# Patient Record
Sex: MALE | Race: WHITE | NOT HISPANIC OR LATINO
[De-identification: names, ages, dates, MRNs, and addresses within clinical notes are randomized per-mention and may not be internally consistent; named-entity substitution may affect disease eponyms.]

---

## 2016-10-10 RX ORDER — LISINOPRIL 2.5 MG/1
1 TABLET ORAL
Qty: 0 | Refills: 0 | COMMUNITY
Start: 2016-10-10

## 2018-08-15 ENCOUNTER — APPOINTMENT (OUTPATIENT)
Dept: INTERNAL MEDICINE | Facility: CLINIC | Age: 83
End: 2018-08-15
Payer: MEDICARE

## 2018-08-15 VITALS
HEIGHT: 68 IN | BODY MASS INDEX: 30.31 KG/M2 | OXYGEN SATURATION: 91 % | TEMPERATURE: 98 F | DIASTOLIC BLOOD PRESSURE: 42 MMHG | HEART RATE: 77 BPM | SYSTOLIC BLOOD PRESSURE: 104 MMHG | WEIGHT: 200 LBS

## 2018-08-15 VITALS — DIASTOLIC BLOOD PRESSURE: 70 MMHG | SYSTOLIC BLOOD PRESSURE: 128 MMHG

## 2018-08-15 DIAGNOSIS — I50.9 HEART FAILURE, UNSPECIFIED: ICD-10-CM

## 2018-08-15 DIAGNOSIS — F03.90 UNSPECIFIED DEMENTIA W/OUT BEHAVIORAL DISTURBANCE: ICD-10-CM

## 2018-08-15 DIAGNOSIS — Z78.9 OTHER SPECIFIED HEALTH STATUS: ICD-10-CM

## 2018-08-15 DIAGNOSIS — Z00.00 ENCOUNTER FOR GENERAL ADULT MEDICAL EXAMINATION W/OUT ABNORMAL FINDINGS: ICD-10-CM

## 2018-08-15 LAB
ALBUMIN SERPL ELPH-MCNC: 3.9
ALP BLD-CCNC: 73
ALT SERPL-CCNC: 7
AST SERPL-CCNC: 15
BILIRUB SERPL-MCNC: 0.8
BUN SERPL-MCNC: 25
CALCIUM SERPL-MCNC: 9.3
CHLORIDE SERPL-SCNC: 101
CO2 SERPL-SCNC: 29
CREAT SERPL-MCNC: 1.1
CREAT SERPL-MCNC: 1.57
GFR SERPL CREATININE-BSD FRML MDRD: 63
POTASSIUM SERPL-SCNC: 4.8
PROT SERPL-MCNC: 6.5
SODIUM SERPL-SCNC: 138
TSH SERPL-ACNC: 2.83

## 2018-08-15 PROCEDURE — G0439: CPT | Mod: PD

## 2018-08-15 PROCEDURE — 99213 OFFICE O/P EST LOW 20 MIN: CPT | Mod: 25,PD

## 2018-08-15 NOTE — HISTORY OF PRESENT ILLNESS
[FreeTextEntry1] : Establish Care [de-identified] : 89 yo male bronchiectasis, COPD, interstitial lung dz, GERD, dementia, HTN, CABG, PTCA, retired police\par Walk Test aborted due to hip pain\par \par PCP Dr Moy changing\par    Dr Marilou Hoffman saw his son had AML and sent to me\par \par Dr Matias Aden Wolsey\par 1) AFIB Jan 2018  amiodarone, Xarelto  failed cardioversion and amio started\par \par 2) Pulm fibrosis 2010\par 3) CABG x 5 1995\par 4) stent x 2 RCA 1998\par 5) Dr Aden advised 40 lasix every other day due to pre renal azotemia written APRIL 2018\par 6) CHF BMP 1800 Feb 2018 [facility] no daily weights\par 7) MR mild\par 8) AS moderate\par \par Lives alone. Children live close\par Forgetful but can follow

## 2018-08-15 NOTE — REVIEW OF SYSTEMS
[Fever] : no fever [Chills] : no chills [Fatigue] : no fatigue [Hot Flashes] : no hot flashes [Chest Pain] : no chest pain [Palpitations] : no palpitations [Claudication] : no  leg claudication

## 2018-08-15 NOTE — HEALTH RISK ASSESSMENT
[Excellent] : ~his/her~ current health as excellent [Very Good] : ~his/her~  mood as very good [No falls in past year] : Patient reported no falls in the past year [Fully functional (bathing, dressing, toileting, transferring, walking, feeding)] : Fully functional (bathing, dressing, toileting, transferring, walking, feeding) [Independent] : preparing meals [Some assistance needed] : managing medications [Full assistance needed] : managing finances [Smoke Detector] : smoke detector [Carbon Monoxide Detector] : carbon monoxide detector [Seat Belt] :  uses seat belt [Sunscreen] : uses sunscreen [Discussed at today's visit] : Advance Directives Discussed at today's visit [Name: ___] : Health Care Proxy's Name: [unfilled]  [Relationship: ___] : Relationship: [unfilled] [] : No [de-identified] : 2PPD x 40 years                  stopped 1990 hypnosis [Reports changes in hearing] : Reports no changes in hearing [Reports changes in vision] : Reports no changes in vision [Reports changes in dental health] : Reports no changes in dental health

## 2018-08-16 ENCOUNTER — INPATIENT (INPATIENT)
Facility: HOSPITAL | Age: 83
LOS: 4 days | Discharge: HOME | DRG: 812 | End: 2018-08-21
Attending: HOSPITALIST | Admitting: HOSPITALIST
Payer: MEDICARE

## 2018-08-16 VITALS
RESPIRATION RATE: 24 BRPM | TEMPERATURE: 98 F | DIASTOLIC BLOOD PRESSURE: 71 MMHG | OXYGEN SATURATION: 93 % | HEART RATE: 24 BPM | SYSTOLIC BLOOD PRESSURE: 154 MMHG

## 2018-08-16 DIAGNOSIS — K92.2 GASTROINTESTINAL HEMORRHAGE, UNSPECIFIED: ICD-10-CM

## 2018-08-16 LAB
25(OH)D3 SERPL-MCNC: 55.1 NG/ML
ALBUMIN SERPL ELPH-MCNC: 3.7 G/DL — SIGNIFICANT CHANGE UP (ref 3.3–5)
ALBUMIN SERPL ELPH-MCNC: 3.8 G/DL
ALP BLD-CCNC: 72 U/L
ALP SERPL-CCNC: 72 U/L — SIGNIFICANT CHANGE UP (ref 40–120)
ALT FLD-CCNC: 7 U/L — LOW (ref 10–45)
ALT SERPL-CCNC: 8 U/L
ANION GAP SERPL CALC-SCNC: 12 MMOL/L
ANION GAP SERPL CALC-SCNC: 9 MMOL/L — SIGNIFICANT CHANGE UP (ref 5–17)
APTT BLD: 36.9 SEC — SIGNIFICANT CHANGE UP (ref 27.5–37.4)
AST SERPL-CCNC: 19 U/L — SIGNIFICANT CHANGE UP (ref 10–40)
AST SERPL-CCNC: 23 U/L
BASE EXCESS BLDV CALC-SCNC: 4 MMOL/L — HIGH (ref -2–2)
BASOPHILS # BLD AUTO: 0 K/UL — SIGNIFICANT CHANGE UP (ref 0–0.2)
BASOPHILS # BLD AUTO: 0.02 K/UL
BASOPHILS NFR BLD AUTO: 0.3 %
BASOPHILS NFR BLD AUTO: 0.4 % — SIGNIFICANT CHANGE UP (ref 0–2)
BILIRUB SERPL-MCNC: 0.8 MG/DL
BILIRUB SERPL-MCNC: 0.8 MG/DL — SIGNIFICANT CHANGE UP (ref 0.2–1.2)
BLD GP AB SCN SERPL QL: NEGATIVE — SIGNIFICANT CHANGE UP
BUN SERPL-MCNC: 24 MG/DL
BUN SERPL-MCNC: 24 MG/DL — HIGH (ref 7–23)
CA-I SERPL-SCNC: 1.16 MMOL/L — SIGNIFICANT CHANGE UP (ref 1.12–1.3)
CALCIUM SERPL-MCNC: 8.9 MG/DL — SIGNIFICANT CHANGE UP (ref 8.4–10.5)
CALCIUM SERPL-MCNC: 9.2 MG/DL
CHLORIDE BLDV-SCNC: 103 MMOL/L — SIGNIFICANT CHANGE UP (ref 96–108)
CHLORIDE SERPL-SCNC: 104 MMOL/L — SIGNIFICANT CHANGE UP (ref 96–108)
CHLORIDE SERPL-SCNC: 99 MMOL/L
CHOLEST SERPL-MCNC: 122 MG/DL
CHOLEST/HDLC SERPL: 3.2 RATIO
CO2 BLDV-SCNC: 31 MMOL/L — HIGH (ref 22–30)
CO2 SERPL-SCNC: 26 MMOL/L — SIGNIFICANT CHANGE UP (ref 22–31)
CO2 SERPL-SCNC: 27 MMOL/L
CREAT SERPL-MCNC: 1.66 MG/DL
CREAT SERPL-MCNC: 1.8 MG/DL — HIGH (ref 0.5–1.3)
EOSINOPHIL # BLD AUTO: 0.16 K/UL
EOSINOPHIL # BLD AUTO: 0.2 K/UL — SIGNIFICANT CHANGE UP (ref 0–0.5)
EOSINOPHIL NFR BLD AUTO: 2.7 %
EOSINOPHIL NFR BLD AUTO: 4.5 % — SIGNIFICANT CHANGE UP (ref 0–6)
GAS PNL BLDV: 139 MMOL/L — SIGNIFICANT CHANGE UP (ref 136–145)
GAS PNL BLDV: SIGNIFICANT CHANGE UP
GAS PNL BLDV: SIGNIFICANT CHANGE UP
GLUCOSE BLDV-MCNC: 89 MG/DL — SIGNIFICANT CHANGE UP (ref 70–99)
GLUCOSE SERPL-MCNC: 86 MG/DL
GLUCOSE SERPL-MCNC: 93 MG/DL — SIGNIFICANT CHANGE UP (ref 70–99)
HBA1C MFR BLD HPLC: 5.4 %
HCO3 BLDV-SCNC: 29 MMOL/L — SIGNIFICANT CHANGE UP (ref 21–29)
HCT VFR BLD CALC: 11.2
HCT VFR BLD CALC: 25.2 % — LOW (ref 39–50)
HCT VFR BLD CALC: 27.6 %
HCT VFR BLDA CALC: 26 % — LOW (ref 39–50)
HDLC SERPL-MCNC: 38 MG/DL
HGB BLD CALC-MCNC: 8.2 G/DL — LOW (ref 13–17)
HGB BLD-MCNC: 8.1 G/DL
HGB BLD-MCNC: 8.3 G/DL — LOW (ref 13–17)
IMM GRANULOCYTES NFR BLD AUTO: 0.2 %
LACTATE BLDV-MCNC: 2.1 MMOL/L — HIGH (ref 0.7–2)
LDLC SERPL CALC-MCNC: 67 MG/DL
LYMPHOCYTES # BLD AUTO: 1.2 K/UL — SIGNIFICANT CHANGE UP (ref 1–3.3)
LYMPHOCYTES # BLD AUTO: 1.58 K/UL
LYMPHOCYTES # BLD AUTO: 22.6 % — SIGNIFICANT CHANGE UP (ref 13–44)
LYMPHOCYTES NFR BLD AUTO: 27.1 %
MAN DIFF?: NORMAL
MCHC RBC-ENTMCNC: 29.3 GM/DL
MCHC RBC-ENTMCNC: 29.9 PG
MCHC RBC-ENTMCNC: 32 PG — SIGNIFICANT CHANGE UP (ref 27–34)
MCHC RBC-ENTMCNC: 32.8 GM/DL — SIGNIFICANT CHANGE UP (ref 32–36)
MCV RBC AUTO: 100.6
MCV RBC AUTO: 101.8 FL
MCV RBC AUTO: 97.6 FL — SIGNIFICANT CHANGE UP (ref 80–100)
MONOCYTES # BLD AUTO: 0.45 K/UL
MONOCYTES # BLD AUTO: 0.5 K/UL — SIGNIFICANT CHANGE UP (ref 0–0.9)
MONOCYTES NFR BLD AUTO: 10.3 % — SIGNIFICANT CHANGE UP (ref 2–14)
MONOCYTES NFR BLD AUTO: 7.7 %
NEUTROPHILS # BLD AUTO: 3.2 K/UL — SIGNIFICANT CHANGE UP (ref 1.8–7.4)
NEUTROPHILS # BLD AUTO: 3.61 K/UL
NEUTROPHILS NFR BLD AUTO: 62 %
NEUTROPHILS NFR BLD AUTO: 62.2 % — SIGNIFICANT CHANGE UP (ref 43–77)
PCO2 BLDV: 52 MMHG — HIGH (ref 35–50)
PH BLDV: 7.37 — SIGNIFICANT CHANGE UP (ref 7.35–7.45)
PLATELET # BLD AUTO: 219 K/UL — SIGNIFICANT CHANGE UP (ref 150–400)
PLATELET # BLD AUTO: 244 K/UL
PO2 BLDV: 20 MMHG — LOW (ref 25–45)
POTASSIUM BLDV-SCNC: 4.7 MMOL/L — SIGNIFICANT CHANGE UP (ref 3.5–5.3)
POTASSIUM SERPL-MCNC: 5.1 MMOL/L — SIGNIFICANT CHANGE UP (ref 3.5–5.3)
POTASSIUM SERPL-SCNC: 4.7 MMOL/L
POTASSIUM SERPL-SCNC: 5.1 MMOL/L — SIGNIFICANT CHANGE UP (ref 3.5–5.3)
PROT SERPL-MCNC: 6.8 G/DL — SIGNIFICANT CHANGE UP (ref 6–8.3)
PROT SERPL-MCNC: 6.9 G/DL
RBC # BLD: 2.58 M/UL — LOW (ref 4.2–5.8)
RBC # BLD: 2.71 M/UL
RBC # FLD: 14.7 % — HIGH (ref 10.3–14.5)
RBC # FLD: 17 %
RH IG SCN BLD-IMP: POSITIVE — SIGNIFICANT CHANGE UP
SAO2 % BLDV: 22 % — LOW (ref 67–88)
SODIUM SERPL-SCNC: 138 MMOL/L
SODIUM SERPL-SCNC: 139 MMOL/L — SIGNIFICANT CHANGE UP (ref 135–145)
TRIGL SERPL-MCNC: 85 MG/DL
WBC # BLD: 5.1 K/UL — SIGNIFICANT CHANGE UP (ref 3.8–10.5)
WBC # FLD AUTO: 5.1 K/UL — SIGNIFICANT CHANGE UP (ref 3.8–10.5)
WBC # FLD AUTO: 5.83 K/UL

## 2018-08-16 PROCEDURE — 99285 EMERGENCY DEPT VISIT HI MDM: CPT

## 2018-08-16 PROCEDURE — 71045 X-RAY EXAM CHEST 1 VIEW: CPT | Mod: 26

## 2018-08-16 RX ORDER — PANTOPRAZOLE SODIUM 20 MG/1
80 TABLET, DELAYED RELEASE ORAL ONCE
Qty: 0 | Refills: 0 | Status: COMPLETED | OUTPATIENT
Start: 2018-08-16 | End: 2018-08-16

## 2018-08-16 RX ADMIN — PANTOPRAZOLE SODIUM 80 MILLIGRAM(S): 20 TABLET, DELAYED RELEASE ORAL at 23:40

## 2018-08-16 NOTE — ED ADULT NURSE NOTE - NSIMPLEMENTINTERV_GEN_ALL_ED
Implemented All Fall with Harm Risk Interventions:  [place] to call system. Call bell, personal items and telephone within reach. Instruct patient to call for assistance. Room bathroom lighting operational. Non-slip footwear when patient is off stretcher. Physically safe environment: no spills, clutter or unnecessary equipment. Stretcher in lowest position, wheels locked, appropriate side rails in place. Provide visual cue, wrist band, yellow gown, etc. Monitor gait and stability. Monitor for mental status changes and reorient to person, place, and time. Review medications for side effects contributing to fall risk. Reinforce activity limits and safety measures with patient and family. Provide visual clues: red socks.

## 2018-08-16 NOTE — ED PROVIDER NOTE - OBJECTIVE STATEMENT
89 yo M was instructed to come into the ED by his PCP (Dr. Ambrose Handley) due to abnormal blood work which indicated pt was anemic. Pt reports no acute symptoms/pain other than being more fatigues/sob on exertion for the past few months and no change in diet. Denies any SOB/CP in ED. PMHx of CABG x5 in 1995, 2 stents in RCA 6/1998, atrial fib, moderate aortic stenosis, pulmonary fibrosis diagnosed in 2000.     Dr. Farzaneh Wade (Internist) [phone] [address], [place] [address]  Dr. Ambrose Handley (New PCP) [phone]  [address] [facility] [address] [address]   Dr. Grigsby (Pulmonologist) [phone] [address] [facility] [address], [place] [address]    Daily Medications:    Xarelto 15 mg daily   lipitor 20mg daily  aricept 5mg daily  ecotrin 81 mg daily  vit D3 2000 IU daily  n-acetyl cysteine 900 mg daily  centrum ilver one daily  lisinopril 5 mg daily  amiodarone 200mg daily  furosemide 40 mg every other day   metoprolol ER 50 mg daily  latanoprost .005% one drop in each eye daily  anoro Ellipta 62.5-25 mcg one inhalation daily 89 yo M was instructed to come into the ED by his PCP (Dr. Ambrose Handley) due to abnormal blood work which indicated pt was anemic. Pt reports no acute symptoms/pain other than being more fatigues/sob on exertion for the past few months and no change in diet. Denies any SOB/CP in ED. PMHx of CABG x5 in 1995, 2 stents in RCA 6/1998, atrial fib, moderate aortic stenosis, pulmonary fibrosis diagnosed in 2000.     Dr. Ambrose Handley (New PCP) [phone] [address] [facility] [address] [address]   Dr. Grigsby (Pulmonologist) [phone] [address] [facility] [address], [place] [address]    Daily Medications:    Xarelto 15 mg daily   lipitor 20mg daily  aricept 5mg daily  ecotrin 81 mg daily  vit D3 2000 IU daily  n-acetyl cysteine 900 mg daily  centrum ilver one daily  lisinopril 5 mg daily  amiodarone 200mg daily  furosemide 40 mg every other day   metoprolol ER 50 mg daily  latanoprost .005% one drop in each eye daily  anoro Ellipta 62.5-25 mcg one inhalation daily

## 2018-08-16 NOTE — ED PROVIDER NOTE - NS_ ATTENDINGSCRIBEDETAILS _ED_A_ED_FT
The scribe's documentation has been prepared under my direction and personally reviewed by me in its entirety. I confirm that the note above accurately reflects all work, treatment, procedures, and medical decision making performed by me (Dr. Stafford).

## 2018-08-16 NOTE — ED ADULT NURSE NOTE - PMH
Atrial fibrillation, unspecified type    Coronary atherosclerosis of unspecified type of vessel, native or graft    Dementia    High Cholesterol    HTN (hypertension)    Inguinal Hernia    Pulmonary fibrosis  diagnosed in 2000 as per family
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
## 2018-08-16 NOTE — ED ADULT NURSE NOTE - OBJECTIVE STATEMENT
Pt sent to ER by PMD for low hemoglobin on blood work done yesterday.  Pt without any history of anemia or GI bleeds.  On Xarelto for a fib.  Has not noticed any bloody or dark stool.  Endorses fatigue and sob with exertion x few months, none at rest.  Pt pale appearing, abd soft/nt/nd, skin wdi, denies cp ,sob, nvd, abd pain, falls, bleeding, urinary symptoms.

## 2018-08-16 NOTE — ED CLERICAL - NS ED CLERK NOTE PRE-ARRIVAL INFORMATION; ADDITIONAL PRE-ARRIVAL INFORMATION
hist of afib, 2 stents in 1998, cabbage x5 in 1995, hem =8.1, mcv=high, pt is dehydrated, cka, anemia  admit to hospitalist

## 2018-08-16 NOTE — ED PROVIDER NOTE - PHYSICAL EXAMINATION
GEN: no acute respiratory distress. nontoxic, speaking comfortably in full sentences, ambulating with steady gait.  HEENT: NCAT. face symmetrical. PERRL 4mm, EOMI, normal auditory canal b/l, normal TM b/l. no hemotympanum. nose midline and without discharge,  MMM, oropharynx wnl.  Neck: no JVD, trachea midline, no LAD  CV: RRR. +S1S2, systolic murmur. 2+ pulses in 4 extremities, cap refill <2 sec. bradycardic rate of 50 with systolic murmur  Chest: CTA B/l. no retractions. crackles BL in both bases good air movement. no tenderness. no rash or ecchymosis  ABD: +BS, soft, non distended, non tender. No guarding/rebound. No lesions, ecchymosis, surgical scar  : no cva or suprapubic tenderness  MSK: No clubbing, cyanosis, trace LE edema. FROM of all extremities. no tenderness to palpation. No midline or paraspinal tenderness. no spinal step-offs.  Neuro: AOOX3.  CN 2-12 intact; Sensation intact, motor 5/5 throughout. finger-nose/heal-shin intact. no ataxia  SKIN: No erythema, lesions or rash

## 2018-08-16 NOTE — ED PROVIDER NOTE - NS ED ROS FT
Constitutional: No fever. no chills. no unexpected weight loss/gain  Eyes: No visual changes, eye pain or redness  HEENT: No throat pain, ear pain, nasal pain. No nose bleeding.  CV: No chest pain, no palpitations  Resp: No shortness of breath, no cough, crackles BL in both bases  GI: No abdominal pain. No nausea or vomiting. No diarrhea. No constipation.   : No dysuria, hematuria. no urinary frequency, no urinary urgency.  MSK: No musculoskeletal pain, trace edema in LE  Skin: No rash, lesions, no bruises  Neuro: No headache. No numbness or tingling. No weakness.  Allergy/Immunology: no allergy to medicine or food.

## 2018-08-16 NOTE — ED PROVIDER NOTE - MEDICAL DECISION MAKING DETAILS
87 yo M called into the ED due to anemia diagnosed by routine blood work by PCP pt has no acute complaints but has been describing dyspnea on exertion for past several months will check CBC CMP, triponin, CXR and reassess.

## 2018-08-17 ENCOUNTER — TRANSCRIPTION ENCOUNTER (OUTPATIENT)
Age: 83
End: 2018-08-17

## 2018-08-17 DIAGNOSIS — I25.10 ATHEROSCLEROTIC HEART DISEASE OF NATIVE CORONARY ARTERY WITHOUT ANGINA PECTORIS: ICD-10-CM

## 2018-08-17 DIAGNOSIS — F03.90 UNSPECIFIED DEMENTIA WITHOUT BEHAVIORAL DISTURBANCE: ICD-10-CM

## 2018-08-17 DIAGNOSIS — I50.9 HEART FAILURE, UNSPECIFIED: ICD-10-CM

## 2018-08-17 DIAGNOSIS — I10 ESSENTIAL (PRIMARY) HYPERTENSION: ICD-10-CM

## 2018-08-17 DIAGNOSIS — R09.89 OTHER SPECIFIED SYMPTOMS AND SIGNS INVOLVING THE CIRCULATORY AND RESPIRATORY SYSTEMS: ICD-10-CM

## 2018-08-17 DIAGNOSIS — I48.91 UNSPECIFIED ATRIAL FIBRILLATION: ICD-10-CM

## 2018-08-17 DIAGNOSIS — N17.9 ACUTE KIDNEY FAILURE, UNSPECIFIED: ICD-10-CM

## 2018-08-17 DIAGNOSIS — Z29.9 ENCOUNTER FOR PROPHYLACTIC MEASURES, UNSPECIFIED: ICD-10-CM

## 2018-08-17 DIAGNOSIS — D64.9 ANEMIA, UNSPECIFIED: ICD-10-CM

## 2018-08-17 DIAGNOSIS — J84.10 PULMONARY FIBROSIS, UNSPECIFIED: ICD-10-CM

## 2018-08-17 LAB
ALBUMIN SERPL ELPH-MCNC: 3.4 G/DL — SIGNIFICANT CHANGE UP (ref 3.3–5)
ALP SERPL-CCNC: 69 U/L — SIGNIFICANT CHANGE UP (ref 40–120)
ALT FLD-CCNC: 6 U/L — LOW (ref 10–45)
ANION GAP SERPL CALC-SCNC: 9 MMOL/L — SIGNIFICANT CHANGE UP (ref 5–17)
AST SERPL-CCNC: 18 U/L — SIGNIFICANT CHANGE UP (ref 10–40)
BASOPHILS # BLD AUTO: 0.03 K/UL — SIGNIFICANT CHANGE UP (ref 0–0.2)
BASOPHILS NFR BLD AUTO: 0.6 % — SIGNIFICANT CHANGE UP (ref 0–2)
BILIRUB SERPL-MCNC: 1 MG/DL — SIGNIFICANT CHANGE UP (ref 0.2–1.2)
BUN SERPL-MCNC: 23 MG/DL — SIGNIFICANT CHANGE UP (ref 7–23)
CALCIUM SERPL-MCNC: 8.7 MG/DL — SIGNIFICANT CHANGE UP (ref 8.4–10.5)
CHLORIDE SERPL-SCNC: 104 MMOL/L — SIGNIFICANT CHANGE UP (ref 96–108)
CO2 SERPL-SCNC: 26 MMOL/L — SIGNIFICANT CHANGE UP (ref 22–31)
CREAT SERPL-MCNC: 1.74 MG/DL — HIGH (ref 0.5–1.3)
EOSINOPHIL # BLD AUTO: 0.14 K/UL — SIGNIFICANT CHANGE UP (ref 0–0.5)
EOSINOPHIL NFR BLD AUTO: 2.9 % — SIGNIFICANT CHANGE UP (ref 0–6)
FOLATE SERPL-MCNC: >20 NG/ML — SIGNIFICANT CHANGE UP
GLUCOSE SERPL-MCNC: 89 MG/DL — SIGNIFICANT CHANGE UP (ref 70–99)
HCT VFR BLD CALC: 25.2 % — LOW (ref 39–50)
HGB BLD-MCNC: 7.9 G/DL — LOW (ref 13–17)
IMM GRANULOCYTES NFR BLD AUTO: 0.2 % — SIGNIFICANT CHANGE UP (ref 0–1.5)
IRON SATN MFR SERPL: 24 UG/DL — LOW (ref 45–165)
IRON SATN MFR SERPL: 7 % — LOW (ref 16–55)
LYMPHOCYTES # BLD AUTO: 1.3 K/UL — SIGNIFICANT CHANGE UP (ref 1–3.3)
LYMPHOCYTES # BLD AUTO: 27.4 % — SIGNIFICANT CHANGE UP (ref 13–44)
MCHC RBC-ENTMCNC: 30.5 PG — SIGNIFICANT CHANGE UP (ref 27–34)
MCHC RBC-ENTMCNC: 31.3 GM/DL — LOW (ref 32–36)
MCV RBC AUTO: 97.3 FL — SIGNIFICANT CHANGE UP (ref 80–100)
MONOCYTES # BLD AUTO: 0.54 K/UL — SIGNIFICANT CHANGE UP (ref 0–0.9)
MONOCYTES NFR BLD AUTO: 11.4 % — SIGNIFICANT CHANGE UP (ref 2–14)
NEUTROPHILS # BLD AUTO: 2.73 K/UL — SIGNIFICANT CHANGE UP (ref 1.8–7.4)
NEUTROPHILS NFR BLD AUTO: 57.5 % — SIGNIFICANT CHANGE UP (ref 43–77)
PLATELET # BLD AUTO: 213 K/UL — SIGNIFICANT CHANGE UP (ref 150–400)
POTASSIUM SERPL-MCNC: 4.5 MMOL/L — SIGNIFICANT CHANGE UP (ref 3.5–5.3)
POTASSIUM SERPL-SCNC: 4.5 MMOL/L — SIGNIFICANT CHANGE UP (ref 3.5–5.3)
PROT SERPL-MCNC: 6.1 G/DL — SIGNIFICANT CHANGE UP (ref 6–8.3)
RBC # BLD: 2.59 M/UL — LOW (ref 4.2–5.8)
RBC # FLD: 16.1 % — HIGH (ref 10.3–14.5)
SODIUM SERPL-SCNC: 139 MMOL/L — SIGNIFICANT CHANGE UP (ref 135–145)
TIBC SERPL-MCNC: 337 UG/DL — SIGNIFICANT CHANGE UP (ref 220–430)
UIBC SERPL-MCNC: 313 UG/DL — SIGNIFICANT CHANGE UP (ref 110–370)
VIT B12 SERPL-MCNC: 621 PG/ML — SIGNIFICANT CHANGE UP (ref 232–1245)
WBC # BLD: 4.75 K/UL — SIGNIFICANT CHANGE UP (ref 3.8–10.5)
WBC # FLD AUTO: 4.75 K/UL — SIGNIFICANT CHANGE UP (ref 3.8–10.5)

## 2018-08-17 PROCEDURE — 99223 1ST HOSP IP/OBS HIGH 75: CPT

## 2018-08-17 PROCEDURE — 12345: CPT | Mod: NC

## 2018-08-17 PROCEDURE — 99222 1ST HOSP IP/OBS MODERATE 55: CPT

## 2018-08-17 RX ORDER — PANTOPRAZOLE SODIUM 20 MG/1
8 TABLET, DELAYED RELEASE ORAL
Qty: 80 | Refills: 0 | Status: DISCONTINUED | OUTPATIENT
Start: 2018-08-17 | End: 2018-08-17

## 2018-08-17 RX ORDER — UMECLIDINIUM BROMIDE AND VILANTEROL TRIFENATATE 62.5; 25 UG/1; UG/1
1 POWDER RESPIRATORY (INHALATION) DAILY
Qty: 0 | Refills: 0 | Status: DISCONTINUED | OUTPATIENT
Start: 2018-08-17 | End: 2018-08-21

## 2018-08-17 RX ORDER — LATANOPROST 0.05 MG/ML
1 SOLUTION/ DROPS OPHTHALMIC; TOPICAL AT BEDTIME
Qty: 0 | Refills: 0 | Status: DISCONTINUED | OUTPATIENT
Start: 2018-08-17 | End: 2018-08-21

## 2018-08-17 RX ORDER — CHOLECALCIFEROL (VITAMIN D3) 125 MCG
1000 CAPSULE ORAL DAILY
Qty: 0 | Refills: 0 | Status: DISCONTINUED | OUTPATIENT
Start: 2018-08-17 | End: 2018-08-21

## 2018-08-17 RX ORDER — PANTOPRAZOLE SODIUM 20 MG/1
40 TABLET, DELAYED RELEASE ORAL
Qty: 0 | Refills: 0 | Status: DISCONTINUED | OUTPATIENT
Start: 2018-08-17 | End: 2018-08-21

## 2018-08-17 RX ORDER — ACETAMINOPHEN 500 MG
650 TABLET ORAL EVERY 6 HOURS
Qty: 0 | Refills: 0 | Status: DISCONTINUED | OUTPATIENT
Start: 2018-08-17 | End: 2018-08-21

## 2018-08-17 RX ORDER — ATORVASTATIN CALCIUM 80 MG/1
20 TABLET, FILM COATED ORAL AT BEDTIME
Qty: 0 | Refills: 0 | Status: DISCONTINUED | OUTPATIENT
Start: 2018-08-17 | End: 2018-08-21

## 2018-08-17 RX ORDER — METOPROLOL TARTRATE 50 MG
25 TABLET ORAL DAILY
Qty: 0 | Refills: 0 | Status: DISCONTINUED | OUTPATIENT
Start: 2018-08-17 | End: 2018-08-21

## 2018-08-17 RX ORDER — AMIODARONE HYDROCHLORIDE 400 MG/1
200 TABLET ORAL DAILY
Qty: 0 | Refills: 0 | Status: DISCONTINUED | OUTPATIENT
Start: 2018-08-17 | End: 2018-08-21

## 2018-08-17 RX ORDER — DONEPEZIL HYDROCHLORIDE 10 MG/1
5 TABLET, FILM COATED ORAL AT BEDTIME
Qty: 0 | Refills: 0 | Status: DISCONTINUED | OUTPATIENT
Start: 2018-08-17 | End: 2018-08-21

## 2018-08-17 RX ORDER — LATANOPROST 0.05 MG/ML
1 SOLUTION/ DROPS OPHTHALMIC; TOPICAL
Qty: 0 | Refills: 0 | COMMUNITY

## 2018-08-17 RX ORDER — ASPIRIN/CALCIUM CARB/MAGNESIUM 324 MG
81 TABLET ORAL
Qty: 0 | Refills: 0 | COMMUNITY

## 2018-08-17 RX ADMIN — LATANOPROST 1 DROP(S): 0.05 SOLUTION/ DROPS OPHTHALMIC; TOPICAL at 21:39

## 2018-08-17 RX ADMIN — UMECLIDINIUM BROMIDE AND VILANTEROL TRIFENATATE 1 PUFF(S): 62.5; 25 POWDER RESPIRATORY (INHALATION) at 11:40

## 2018-08-17 RX ADMIN — Medication 1000 UNIT(S): at 11:39

## 2018-08-17 RX ADMIN — PANTOPRAZOLE SODIUM 40 MILLIGRAM(S): 20 TABLET, DELAYED RELEASE ORAL at 11:39

## 2018-08-17 RX ADMIN — PANTOPRAZOLE SODIUM 40 MILLIGRAM(S): 20 TABLET, DELAYED RELEASE ORAL at 17:34

## 2018-08-17 RX ADMIN — ATORVASTATIN CALCIUM 20 MILLIGRAM(S): 80 TABLET, FILM COATED ORAL at 21:39

## 2018-08-17 RX ADMIN — DONEPEZIL HYDROCHLORIDE 5 MILLIGRAM(S): 10 TABLET, FILM COATED ORAL at 21:39

## 2018-08-17 RX ADMIN — PANTOPRAZOLE SODIUM 10 MG/HR: 20 TABLET, DELAYED RELEASE ORAL at 01:26

## 2018-08-17 RX ADMIN — AMIODARONE HYDROCHLORIDE 200 MILLIGRAM(S): 400 TABLET ORAL at 11:39

## 2018-08-17 RX ADMIN — PANTOPRAZOLE SODIUM 10 MG/HR: 20 TABLET, DELAYED RELEASE ORAL at 08:56

## 2018-08-17 NOTE — CONSULT NOTE ADULT - ATTENDING COMMENTS
89 yo M pmh dementia, HTN, pulm fibrosis not on o2, cad s/p cabg s/p angel (? ASA?), afib on Xarelto presenting for acute, symptomatic anemia with no overt sign of GIB.  Gradual development of symptoms and sent in by PCP for anemia.  H&H stable over 8 hours with brown stool. Some tachypnea on exam, but conversing easily with no hypoxia.  Suspect symptomatic from anemia.  Ultimately, if family and patient amenable, reasonable to pursue GI evaluation as inpatient.    Impression:  1) Symptomatic anemia  2) CAD s/p CABG  3) Afib on Xarelto  4) Dementia  5) Pulm Fibrosis    Plan:  1) Full diet today, start clear diet saturday evening if planning for procedures monday  2) EGD/Colon Monday if patient/family amenable  3) F/u Iron studies  4) Given symptomatic, reasonable to give 1 unit prbc - defer to the Hospitalist team  5) Daily PPI is adequate  6) Please clarify home antiplatelet and anticoagulant agents as will dictate if warrants PPI ppx

## 2018-08-17 NOTE — H&P ADULT - NSHPPHYSICALEXAM_GEN_ALL_CORE
Vital Signs Last 24 Hrs  T(C): 36.6 (16 Aug 2018 23:30), Max: 36.7 (16 Aug 2018 18:59)  T(F): 97.9 (16 Aug 2018 23:30), Max: 98 (16 Aug 2018 18:59)  HR: 55 (16 Aug 2018 23:30) (24 - 55)  BP: 154/75 (16 Aug 2018 23:30) (134/42 - 160/57)  BP(mean): --  RR: 18 (16 Aug 2018 23:30) (18 - 24)  SpO2: 98% (16 Aug 2018 23:30) (93% - 99%) Vital Signs Last 24 Hrs  T(C): 36.6 (16 Aug 2018 23:30), Max: 36.7 (16 Aug 2018 18:59)  T(F): 97.9 (16 Aug 2018 23:30), Max: 98 (16 Aug 2018 18:59)  HR: 55 (16 Aug 2018 23:30) (24 - 55)  BP: 154/75 (16 Aug 2018 23:30) (134/42 - 160/57)  BP(mean): --  RR: 18 (16 Aug 2018 23:30) (18 - 24)  SpO2: 98% (16 Aug 2018 23:30) (93% - 99%)    GENERAL: No acute distress, well-developed  HEAD:  Atraumatic, Normocephalic  ENT: EOMI, PERRLA, conjunctiva and sclera clear,  moist mucosa no pharyngeal erythema or exudates   NECK: supple , no JVD   CHEST/LUNG: Clear to auscultation bilaterally; No wheeze, equal breath sounds bilaterally   BACK: No spinal tenderness,  No CVA tenderness   HEART: Regular rate and rhythm;+ systolic murmur at apex , no rubs, or gallops  ABDOMEN: Soft, Nontender, Nondistended; Bowel sounds present  GI: Per ED exam , soft stool , no gross blood or melena, + guaiac   EXTREMITIES:  No clubbing, cyanosis, + trace  edema  MSK: No joint swelling or effusions, ROM intact   PSYCH: Normal behavior/affect  NEUROLOGY: AAOx3, non-focal, cranial nerves intact  SKIN: Normal color, No rashes or lesions

## 2018-08-17 NOTE — H&P ADULT - PROBLEM SELECTOR PLAN 1
hgb decreased to 8.3 from 11 in march , MCV wnl however RDW increased , suspect occult GI bleeding   - continue PPI   - GI consult emailed - day team to f/u further recommendations   - clear liquid diet for anticipated GI intervention   - f/u am CBC  - iron panels/ b12/folate   - Hold Xarelto  - hold ASA

## 2018-08-17 NOTE — H&P ADULT - ASSESSMENT
88 M w/pmh dementia , HTN , pulmonary fibrosis , cad s/p CABG (’95) and s/p stent x 2 , and  atrial fibrillation on Xarelto, p/w  anemia

## 2018-08-17 NOTE — H&P ADULT - PROBLEM SELECTOR PLAN 2
2/2 to lasix vs. blood loss   - hold lasix   - hold lisinopril   - strict ins and outs   - monitor renal fx

## 2018-08-17 NOTE — H&P ADULT - PROBLEM SELECTOR PLAN 3
- holding Xarelto in setting of GI bleeding  - continue amiodarone   - decreased metoprolol dose to 25 given bradycardia , can increase back to 50mg if tolerating

## 2018-08-17 NOTE — CONSULT NOTE ADULT - ASSESSMENT
Impression:  1) Anemia - normocytic with low iron and low saturation consistent and normal TIBC with iron def anemia.    Recommendations:   - Impression:  1) Anemia - normocytic with low iron and low saturation consistent and normal TIBC with iron def anemia.  Patient is HD stable without any overt GI bleeding and rectal exam is significant for brown stool in vault.  Patient may have occult GI bleeding but is not likely that the source of the anemia is from a more brisk GI source such as PUD, diverticulosis, or an actively bleeding angiectasia what would require an inpatient intervention given patient presentation.    Recommendations:   - no need for inpatient endoscopic intervention   - patient can follow up with outpatient gastroenterologist   - can continue to trend H/H while admitted   - transfuse if Hg < 7   - call GI with an overt GI bleeding   - rest of care per primary team   - call GI back with any other farhan Hughes, PGY-4  Gastroenterology Fellow  (After 5 pm page GI on call) Impression:  1) Anemia - normocytic with low iron and low saturation consistent and normal TIBC with iron def anemia.  Patient is HD stable without any overt GI bleeding and rectal exam is significant for brown stool in vault.  Source possibly a GI source    Recommendations:   - no need for urgent EGD but will plan for EGD/colon on Monday   - no need for PPI drip can switch to BID or PO   - can continue to trend H/H while admitted   - transfuse if Hg < 7   - call GI with an overt GI bleeding   - rest of care per primary team    Antonina Hughes, PGY-4  Gastroenterology Fellow  (After 5 pm page GI on call) Impression:  1) Anemia - normocytic with low iron and low saturation consistent and normal TIBC with iron def anemia.  Patient is HD stable without any overt GI bleeding and rectal exam is significant for brown stool in vault.  Source possibly a GI given significant drop, possibly occult bleeding vs, PUD vs angioectasias, less likely diverticulosis given lack of BRBPR.    Recommendations:   - no need for urgent EGD but will plan for EGD/colon on Monday   - clear diet Saturday   - NPO after midnight Sunday   - Movi Prep Sunday night   - patient will need to take Movi Prep 1 Liter at 6:00 pm and 1 Liter at 10:00 pm   - ensure patient has completed entire prep and is having clear bowel movements   - no need for PPI drip can switch to BID or PO   - can continue to trend H/H while admitted   - transfuse if Hg < 7   - call GI with an overt GI bleeding   - rest of care per primary team    Referance : Effectiveness of a simplidied "patient friendly" split dose polyethylene glycol colonoscopy prep in [facility] patients. Iglesia MCCORD et al Interv Gastroenterol. (2012)    Antonina Hughes, PGY-4  Gastroenterology Fellow  (After 5 pm page GI on call)

## 2018-08-17 NOTE — H&P ADULT - HISTORY OF PRESENT ILLNESS
Patient is an 88 year old male with a past medical history significant for dementia , HTN , pulmonary fibrosis , cad s/p CABG (’95) and s/p stent x 2 , and  atrial fibrillation on Xarelto, who presents for anemia found on outpatient labs. Patient is an 88 year old male with a past medical history significant for dementia , HTN , pulmonary fibrosis , cad s/p CABG (’95) and s/p stent x 2 , and  atrial fibrillation on Xarelto, who presents for anemia found on outpatient labs. Per Patients daughter, patient has chronic dyspnea on exertion secondary to pulmonary fibrosis , however for the past several weeks he was noticeably more fatigued and had increased dyspnea. He had no abdominal pain , no hematochezia and no melena. Patient was diagnosed with Afib this past December and cardioverted at that time and subsequently been on Xarelto without bleeding incident. Patient also on baby aspirin. No NSIAD use. Per daughter, most recent hgb in March '18 was about 11.3 , currently around 8. last colonoscopy was about 10 years ago with diverticulosis. Patient currently reports he feels fine, no active complaints.

## 2018-08-17 NOTE — H&P ADULT - NSHPLABSRESULTS_GEN_ALL_CORE
Labs personally reviewed:                          8.3    5.1   )-----------( 219      ( 16 Aug 2018 20:06 )             25.2     08-16    139  |  104  |  24<H>  ----------------------------<  93  5.1   |  26  |  1.80<H>    Ca    8.9      16 Aug 2018 20:06    TPro  6.8  /  Alb  3.7  /  TBili  0.8  /  DBili  x   /  AST  19  /  ALT  7<L>  /  AlkPhos  72  08-16        LIVER FUNCTIONS - ( 16 Aug 2018 20:06 )  Alb: 3.7 g/dL / Pro: 6.8 g/dL / ALK PHOS: 72 U/L / ALT: 7 U/L / AST: 19 U/L / GGT: x           PTT - ( 16 Aug 2018 20:06 )  PTT:36.9 sec    CAPILLARY BLOOD GLUCOSE          Imaging:  CXR personally reviewed: no focal opacity    EKG personally reviewed: Labs personally reviewed:                          8.3    5.1   )-----------( 219      ( 16 Aug 2018 20:06 )             25.2     08-16    139  |  104  |  24<H>  ----------------------------<  93  5.1   |  26  |  1.80<H>    Ca    8.9      16 Aug 2018 20:06    TPro  6.8  /  Alb  3.7  /  TBili  0.8  /  DBili  x   /  AST  19  /  ALT  7<L>  /  AlkPhos  72  08-16        LIVER FUNCTIONS - ( 16 Aug 2018 20:06 )  Alb: 3.7 g/dL / Pro: 6.8 g/dL / ALK PHOS: 72 U/L / ALT: 7 U/L / AST: 19 U/L / GGT: x           PTT - ( 16 Aug 2018 20:06 )  PTT:36.9 sec    CAPILLARY BLOOD GLUCOSE          Imaging:  CXR personally reviewed: no focal opacity    EKG personally reviewed: sinus bradycardia at 51 bpm , no s-t segment changes

## 2018-08-17 NOTE — CONSULT NOTE ADULT - SUBJECTIVE AND OBJECTIVE BOX
Chief Complaint:  Patient is a 88y old  Male who presents with a chief complaint of anemia (17 Aug 2018 01:39)      HPI:  88 year old male with PMHx of dementia , HTN , pulmonary fibrosis , cad s/p CABG (1995) and s/p stent x 2 ,   atrial fibrillation on Xarelto, who presents for anemia found on outpatient labs. The patient has chronic dyspnea on exertion but for the past several weeks he was noticeably more fatigued and had increased dyspnea.  Per daughter, most recent hgb in March of this yearwas about 11.3 , currently around 8. The patient denies any abdominal pain, melena, hematachezia, NSAID use.  The patient has had a colon 10 years ago which showed diverticulosis    Allergies:  No Known Allergies      Home Medications:    Hospital Medications:  acetaminophen   Tablet. 650 milliGRAM(s) Oral every 6 hours PRN  amiodarone    Tablet 200 milliGRAM(s) Oral daily  atorvastatin 20 milliGRAM(s) Oral at bedtime  cholecalciferol 1000 Unit(s) Oral daily  donepezil 5 milliGRAM(s) Oral at bedtime  latanoprost 0.005% Ophthalmic Solution 1 Drop(s) Both EYES at bedtime  metoprolol succinate ER 25 milliGRAM(s) Oral daily  pantoprazole Infusion 8 mG/Hr IV Continuous <Continuous>  umeclidinium 62.5 MICROgram(s)/vilanterol 25 MICROgram(s) Inhaler 1 Puff(s) Inhalation daily      PMHX/PSHX:  Pulmonary fibrosis  Atrial fibrillation, unspecified type  Dementia  HTN (hypertension)  Inguinal Hernia  High Cholesterol  Coronary atherosclerosis of unspecified type of vessel, native or graft  S/P CABG (Coronary Artery Bypass Graft)  s/p Angioplasty with Stent      Family history:  Family history of stomach cancer (Sibling)      Social History:     ROS:     General:  No wt loss, fevers, chills, night sweats, fatigue,   Eyes:  Good vision, no reported pain  ENT:  No sore throat, pain, runny nose, dysphagia  CV:  No pain, palpitations, hypo/hypertension  Resp:  No dyspnea, cough, tachypnea, wheezing  GI:  No pain, No nausea, No vomiting, No diarrhea, No constipation, No weight loss, No fever, No pruritis, No rectal bleeding, No tarry stools, No dysphagia,  :  No pain, bleeding, incontinence, nocturia  Muscle:  No pain, weakness  Neuro:  No weakness, tingling, memory problems  Psych:  No fatigue, insomnia, mood problems, depression  Endocrine:  No polyuria, polydipsia, cold/heat intolerance  Heme:  No petechiae, ecchymosis, easy bruisability  Skin:  No rash, tattoos, scars, edema      PHYSICAL EXAM:     GENERAL:  Appears stated age, well-groomed, well-nourished, no distress  ABDOMEN:  Soft, non-tender, non-distended, normoactive bowel sounds,  no masses ,no hepato-splenomegaly, no signs of chronic liver disease  EXTEREMITIES:  no cyanosis,clubbing or edema  NEURO:  Alert, oriented, no tremor    Vital Signs:  Vital Signs Last 24 Hrs  T(C): 37.1 (17 Aug 2018 04:26), Max: 37.1 (17 Aug 2018 04:26)  T(F): 98.7 (17 Aug 2018 04:26), Max: 98.7 (17 Aug 2018 04:26)  HR: 57 (17 Aug 2018 04:26) (24 - 57)  BP: 152/65 (17 Aug 2018 04:26) (134/42 - 160/57)  BP(mean): --  RR: 18 (17 Aug 2018 04:26) (18 - 24)  SpO2: 96% (17 Aug 2018 04:26) (93% - 99%)  Daily Height in cm: 172.72 (16 Aug 2018 23:30)    Daily     LABS:                        8.3    5.1   )-----------( 219      ( 16 Aug 2018 20:06 )             25.2     Mean Cell Volume: 97.6 fl (08-16-18 @ 20:06)    08-17    139  |  104  |  23  ----------------------------<  89  4.5   |  26  |  1.74<H>    Ca    8.7      17 Aug 2018 05:59    TPro  6.1  /  Alb  3.4  /  TBili  1.0  /  DBili  x   /  AST  18  /  ALT  6<L>  /  AlkPhos  69  08-17    LIVER FUNCTIONS - ( 17 Aug 2018 05:59 )  Alb: 3.4 g/dL / Pro: 6.1 g/dL / ALK PHOS: 69 U/L / ALT: 6 U/L / AST: 18 U/L / GGT: x           PTT - ( 16 Aug 2018 20:06 )  PTT:36.9 sec                            8.3    5.1   )-----------( 219      ( 16 Aug 2018 20:06 )             25.2     Imaging: Chief Complaint:  Patient is a 88y old  Male who presents with a chief complaint of anemia (17 Aug 2018 01:39)      HPI:  88 year old male with PMHx of dementia , HTN , pulmonary fibrosis , cad s/p CABG (1995) and s/p stent x 2 ,   atrial fibrillation on Xarelto, who presents for anemia found on outpatient labs. The patient has chronic dyspnea on exertion but for the past several weeks he was noticeably more fatigued and had increased dyspnea.  Per daughter, most recent hgb in March of this yearwas about 11.3 , currently around 8. At home the patient was having a BM every day to every other day and the where soft and brown.  The patient denies any abdominal pain, melena, hematachezia, NSAID or alcohol use.  The patient has had a colon 10 years ago which showed diverticulosis and has never had an EGD.    Allergies:  No Known Allergies      Home Medications:    Hospital Medications:  acetaminophen   Tablet. 650 milliGRAM(s) Oral every 6 hours PRN  amiodarone    Tablet 200 milliGRAM(s) Oral daily  atorvastatin 20 milliGRAM(s) Oral at bedtime  cholecalciferol 1000 Unit(s) Oral daily  donepezil 5 milliGRAM(s) Oral at bedtime  latanoprost 0.005% Ophthalmic Solution 1 Drop(s) Both EYES at bedtime  metoprolol succinate ER 25 milliGRAM(s) Oral daily  pantoprazole Infusion 8 mG/Hr IV Continuous <Continuous>  umeclidinium 62.5 MICROgram(s)/vilanterol 25 MICROgram(s) Inhaler 1 Puff(s) Inhalation daily      PMHX/PSHX:  Pulmonary fibrosis  Atrial fibrillation, unspecified type  Dementia  HTN (hypertension)  Inguinal Hernia  High Cholesterol  Coronary atherosclerosis of unspecified type of vessel, native or graft  S/P CABG (Coronary Artery Bypass Graft)  s/p Angioplasty with Stent      Family history:  Family history of stomach cancer (Sibling)      Social History:     ROS:     General:  No wt loss, fevers, chills, night sweats, fatigue,   Eyes:  Good vision, no reported pain  ENT:  No sore throat, pain, runny nose, dysphagia  CV:  No pain, palpitations, hypo/hypertension  Resp:  No dyspnea, cough, tachypnea, wheezing  GI:  No pain, No nausea, No vomiting, No diarrhea, No constipation, No weight loss, No fever, No pruritis, No rectal bleeding, No tarry stools, No dysphagia,  :  No pain, bleeding, incontinence, nocturia  Muscle:  No pain, weakness  Neuro:  No weakness, tingling, memory problems  Psych:  No fatigue, insomnia, mood problems, depression  Endocrine:  No polyuria, polydipsia, cold/heat intolerance  Heme:  No petechiae, ecchymosis, easy bruisability  Skin:  No rash, tattoos, scars, edema      PHYSICAL EXAM:     GENERAL:  Appears stated age, well-groomed, well-nourished, no distress, eating a clear liquid breakfast  ABDOMEN:  Soft, non-tender, non-distended, normoactive bowel sounds,  no masses ,no hepato-splenomegaly, no signs of chronic liver disease  EXTEREMITIES:  no cyanosis,clubbing or edema  NEURO:  Alert, oriented, no tremor  Rectal: brown stool, good tone    Vital Signs:  Vital Signs Last 24 Hrs  T(C): 37.1 (17 Aug 2018 04:26), Max: 37.1 (17 Aug 2018 04:26)  T(F): 98.7 (17 Aug 2018 04:26), Max: 98.7 (17 Aug 2018 04:26)  HR: 57 (17 Aug 2018 04:26) (24 - 57)  BP: 152/65 (17 Aug 2018 04:26) (134/42 - 160/57)  BP(mean): --  RR: 18 (17 Aug 2018 04:26) (18 - 24)  SpO2: 96% (17 Aug 2018 04:26) (93% - 99%)  Daily Height in cm: 172.72 (16 Aug 2018 23:30)    Daily     LABS:                        8.3    5.1   )-----------( 219      ( 16 Aug 2018 20:06 )             25.2     Mean Cell Volume: 97.6 fl (08-16-18 @ 20:06)    08-17    139  |  104  |  23  ----------------------------<  89  4.5   |  26  |  1.74<H>    Ca    8.7      17 Aug 2018 05:59    TPro  6.1  /  Alb  3.4  /  TBili  1.0  /  DBili  x   /  AST  18  /  ALT  6<L>  /  AlkPhos  69  08-17    LIVER FUNCTIONS - ( 17 Aug 2018 05:59 )  Alb: 3.4 g/dL / Pro: 6.1 g/dL / ALK PHOS: 69 U/L / ALT: 6 U/L / AST: 18 U/L / GGT: x           PTT - ( 16 Aug 2018 20:06 )  PTT:36.9 sec                            8.3    5.1   )-----------( 219      ( 16 Aug 2018 20:06 )             25.2     Imaging:

## 2018-08-17 NOTE — H&P ADULT - NSHPREVIEWOFSYSTEMS_GEN_ALL_CORE
CONSTITUTIONAL: + weakness, no fevers or chills  EYES/ENT: No visual changes;  No dysphagia  NECK: No pain or stiffness  RESPIRATORY: No cough, wheezing, hemoptysis; No shortness of breath  CARDIOVASCULAR: No chest pain or palpitations; + lower extremity edema  EXTREMITIES: no le edema, cyanosis, clubbing  GASTROINTESTINAL: No abdominal or epigastric pain. No nausea, vomiting, or hematemesis; No diarrhea or constipation. No melena or hematochezia.  BACK: No back pain  GENITOURINARY: No dysuria, frequency or hematuria  NEUROLOGICAL: No numbness or weakness  MSK: no joint swelling or pain  SKIN: No itching, burning, rashes, or lesions   PSYCH: no agitation  All other review of systems is negative unless indicated above.

## 2018-08-17 NOTE — PROGRESS NOTE ADULT - ASSESSMENT
89yo M pmh dementia , HTN , pulmonary fibrosis , cad s/p CABG (’95) and s/p stent x 2 , and paroxysmal atrial fibrillation on anemia, no evidence of current active bleeding. 89yo M pmh dementia , HTN , pulmonary fibrosis , cad s/p CABG (’95) and s/p stent x 2 , and paroxysmal atrial fibrillation on xarelto a/w symptomatic anemia, no evidence of current active bleeding.

## 2018-08-17 NOTE — H&P ADULT - NSHPSOCIALHISTORY_GEN_ALL_CORE
Social History:    Marital Status:  (   )    (   ) Single    (   )    ( x )   Occupation:   Lives with: ( x ) alone  (  ) children   (  ) spouse   (  ) parents  (  ) other    Substance Use (street drugs): (x  ) never used  (  ) other:  Tobacco Usage:  (   ) never smoked   ( x  ) former smoker , 1.5ppd x 30 yrs quit 1990  (   ) current smoker  (     ) pack year  (        ) last cigarette date  Alcohol Usage: no etoh use

## 2018-08-17 NOTE — PROGRESS NOTE ADULT - PROBLEM SELECTOR PLAN 3
- holding Xarelto in setting of anemia work up  - continue amiodarone   - decreased metoprolol dose to 25 given bradycardia  -monitor on tele - CHADSVasc 3  - holding Xarelto in setting of anemia work up  - continue amiodarone   - decreased metoprolol dose to 25 given bradycardia  -monitor on tele

## 2018-08-18 LAB
ANION GAP SERPL CALC-SCNC: 11 MMOL/L — SIGNIFICANT CHANGE UP (ref 5–17)
BUN SERPL-MCNC: 26 MG/DL — HIGH (ref 7–23)
CALCIUM SERPL-MCNC: 9 MG/DL — SIGNIFICANT CHANGE UP (ref 8.4–10.5)
CHLORIDE SERPL-SCNC: 100 MMOL/L — SIGNIFICANT CHANGE UP (ref 96–108)
CO2 SERPL-SCNC: 24 MMOL/L — SIGNIFICANT CHANGE UP (ref 22–31)
CREAT SERPL-MCNC: 1.79 MG/DL — HIGH (ref 0.5–1.3)
FERRITIN SERPL-MCNC: 24 NG/ML — LOW (ref 30–400)
GLUCOSE SERPL-MCNC: 102 MG/DL — HIGH (ref 70–99)
HCT VFR BLD CALC: 26.6 % — LOW (ref 39–50)
HGB BLD-MCNC: 8.2 G/DL — LOW (ref 13–17)
MAGNESIUM SERPL-MCNC: 2.1 MG/DL — SIGNIFICANT CHANGE UP (ref 1.6–2.6)
MCHC RBC-ENTMCNC: 30.1 PG — SIGNIFICANT CHANGE UP (ref 27–34)
MCHC RBC-ENTMCNC: 30.8 GM/DL — LOW (ref 32–36)
MCV RBC AUTO: 97.8 FL — SIGNIFICANT CHANGE UP (ref 80–100)
PHOSPHATE SERPL-MCNC: 3.3 MG/DL — SIGNIFICANT CHANGE UP (ref 2.5–4.5)
PLATELET # BLD AUTO: 238 K/UL — SIGNIFICANT CHANGE UP (ref 150–400)
POTASSIUM SERPL-MCNC: 4.7 MMOL/L — SIGNIFICANT CHANGE UP (ref 3.5–5.3)
POTASSIUM SERPL-SCNC: 4.7 MMOL/L — SIGNIFICANT CHANGE UP (ref 3.5–5.3)
RBC # BLD: 2.72 M/UL — LOW (ref 4.2–5.8)
RBC # BLD: 2.72 M/UL — LOW (ref 4.2–5.8)
RBC # FLD: 16.1 % — HIGH (ref 10.3–14.5)
RETICS #: 108.5 K/UL — SIGNIFICANT CHANGE UP (ref 25–125)
RETICS/RBC NFR: 4 % — HIGH (ref 0.5–2.5)
SODIUM SERPL-SCNC: 135 MMOL/L — SIGNIFICANT CHANGE UP (ref 135–145)
WBC # BLD: 5.17 K/UL — SIGNIFICANT CHANGE UP (ref 3.8–10.5)
WBC # FLD AUTO: 5.17 K/UL — SIGNIFICANT CHANGE UP (ref 3.8–10.5)

## 2018-08-18 PROCEDURE — 99233 SBSQ HOSP IP/OBS HIGH 50: CPT

## 2018-08-18 RX ADMIN — AMIODARONE HYDROCHLORIDE 200 MILLIGRAM(S): 400 TABLET ORAL at 05:20

## 2018-08-18 RX ADMIN — DONEPEZIL HYDROCHLORIDE 5 MILLIGRAM(S): 10 TABLET, FILM COATED ORAL at 21:13

## 2018-08-18 RX ADMIN — LATANOPROST 1 DROP(S): 0.05 SOLUTION/ DROPS OPHTHALMIC; TOPICAL at 21:13

## 2018-08-18 RX ADMIN — PANTOPRAZOLE SODIUM 40 MILLIGRAM(S): 20 TABLET, DELAYED RELEASE ORAL at 17:44

## 2018-08-18 RX ADMIN — Medication 1000 UNIT(S): at 12:00

## 2018-08-18 RX ADMIN — PANTOPRAZOLE SODIUM 40 MILLIGRAM(S): 20 TABLET, DELAYED RELEASE ORAL at 05:20

## 2018-08-18 RX ADMIN — ATORVASTATIN CALCIUM 20 MILLIGRAM(S): 80 TABLET, FILM COATED ORAL at 21:14

## 2018-08-18 RX ADMIN — UMECLIDINIUM BROMIDE AND VILANTEROL TRIFENATATE 1 PUFF(S): 62.5; 25 POWDER RESPIRATORY (INHALATION) at 12:00

## 2018-08-18 NOTE — PROGRESS NOTE ADULT - PROBLEM SELECTOR PLAN 3
CHADSVasc 3, on xarelto at home  - holding xarelto 2/2 GIB  - continue amiodarone   - decreased metoprolol dose to 25 given bradycardia  - monitor on tele

## 2018-08-18 NOTE — PROGRESS NOTE ADULT - ASSESSMENT
89yo M pmh dementia , HTN , pulmonary fibrosis , CAD s/p CABG (’95) and s/p stent x 2 , and parox atrial fibrillation on xarelto p/w incr SOB, lethargy adm with symptomatic anemia pending EGD/cscope 8/21, found JASON on CKD.

## 2018-08-19 LAB
ANION GAP SERPL CALC-SCNC: 12 MMOL/L — SIGNIFICANT CHANGE UP (ref 5–17)
BUN SERPL-MCNC: 19 MG/DL — SIGNIFICANT CHANGE UP (ref 7–23)
CALCIUM SERPL-MCNC: 9.1 MG/DL — SIGNIFICANT CHANGE UP (ref 8.4–10.5)
CHLORIDE SERPL-SCNC: 102 MMOL/L — SIGNIFICANT CHANGE UP (ref 96–108)
CO2 SERPL-SCNC: 23 MMOL/L — SIGNIFICANT CHANGE UP (ref 22–31)
CREAT SERPL-MCNC: 1.79 MG/DL — HIGH (ref 0.5–1.3)
CREAT SERPL-MCNC: 1.82 MG/DL — HIGH (ref 0.5–1.3)
GLUCOSE SERPL-MCNC: 101 MG/DL — HIGH (ref 70–99)
HCT VFR BLD CALC: 26.1 % — LOW (ref 39–50)
HGB BLD-MCNC: 8.1 G/DL — LOW (ref 13–17)
LACTATE SERPL-SCNC: 1 MMOL/L — SIGNIFICANT CHANGE UP (ref 0.7–2)
MCHC RBC-ENTMCNC: 30.2 PG — SIGNIFICANT CHANGE UP (ref 27–34)
MCHC RBC-ENTMCNC: 31 GM/DL — LOW (ref 32–36)
MCV RBC AUTO: 97.4 FL — SIGNIFICANT CHANGE UP (ref 80–100)
PLATELET # BLD AUTO: 233 K/UL — SIGNIFICANT CHANGE UP (ref 150–400)
POTASSIUM SERPL-MCNC: 5 MMOL/L — SIGNIFICANT CHANGE UP (ref 3.5–5.3)
POTASSIUM SERPL-SCNC: 5 MMOL/L — SIGNIFICANT CHANGE UP (ref 3.5–5.3)
RBC # BLD: 2.68 M/UL — LOW (ref 4.2–5.8)
RBC # FLD: 16 % — HIGH (ref 10.3–14.5)
SODIUM SERPL-SCNC: 137 MMOL/L — SIGNIFICANT CHANGE UP (ref 135–145)
SODIUM UR-SCNC: 21 MMOL/L — SIGNIFICANT CHANGE UP
WBC # BLD: 3.87 K/UL — SIGNIFICANT CHANGE UP (ref 3.8–10.5)
WBC # FLD AUTO: 3.87 K/UL — SIGNIFICANT CHANGE UP (ref 3.8–10.5)

## 2018-08-19 PROCEDURE — 99232 SBSQ HOSP IP/OBS MODERATE 35: CPT | Mod: GC

## 2018-08-19 PROCEDURE — 99233 SBSQ HOSP IP/OBS HIGH 50: CPT

## 2018-08-19 RX ORDER — SOD SULF/SODIUM/NAHCO3/KCL/PEG
1000 SOLUTION, RECONSTITUTED, ORAL ORAL EVERY 4 HOURS
Qty: 0 | Refills: 0 | Status: COMPLETED | OUTPATIENT
Start: 2018-08-19 | End: 2018-08-19

## 2018-08-19 RX ADMIN — Medication 1000 UNIT(S): at 12:20

## 2018-08-19 RX ADMIN — LATANOPROST 1 DROP(S): 0.05 SOLUTION/ DROPS OPHTHALMIC; TOPICAL at 22:26

## 2018-08-19 RX ADMIN — UMECLIDINIUM BROMIDE AND VILANTEROL TRIFENATATE 1 PUFF(S): 62.5; 25 POWDER RESPIRATORY (INHALATION) at 12:20

## 2018-08-19 RX ADMIN — DONEPEZIL HYDROCHLORIDE 5 MILLIGRAM(S): 10 TABLET, FILM COATED ORAL at 22:26

## 2018-08-19 RX ADMIN — AMIODARONE HYDROCHLORIDE 200 MILLIGRAM(S): 400 TABLET ORAL at 06:00

## 2018-08-19 RX ADMIN — Medication 1000 MILLILITER(S): at 17:33

## 2018-08-19 RX ADMIN — PANTOPRAZOLE SODIUM 40 MILLIGRAM(S): 20 TABLET, DELAYED RELEASE ORAL at 17:33

## 2018-08-19 RX ADMIN — ATORVASTATIN CALCIUM 20 MILLIGRAM(S): 80 TABLET, FILM COATED ORAL at 22:26

## 2018-08-19 RX ADMIN — PANTOPRAZOLE SODIUM 40 MILLIGRAM(S): 20 TABLET, DELAYED RELEASE ORAL at 06:00

## 2018-08-19 RX ADMIN — Medication 1000 MILLILITER(S): at 22:26

## 2018-08-19 RX ADMIN — Medication 25 MILLIGRAM(S): at 06:00

## 2018-08-19 NOTE — PROGRESS NOTE ADULT - ASSESSMENT
87yo M pmh dementia , HTN , pulmonary fibrosis , CAD s/p CABG (’95) and s/p stent x 2 , and parox atrial fibrillation on xarelto p/w incr SOB, lethargy adm with symptomatic anemia pending EGD/cscope 8/21, found JASON on CKD.

## 2018-08-19 NOTE — PHYSICAL THERAPY INITIAL EVALUATION ADULT - ADDITIONAL COMMENTS
Pt lives alone in a private house with 4 steps to enter and one flight of steps inside. Pt was Ind with all ADLs and amb without AD. Pt's dtr assist with groceries.

## 2018-08-19 NOTE — PHYSICAL THERAPY INITIAL EVALUATION ADULT - PLANNED THERAPY INTERVENTIONS, PT EVAL
gait training/strengthening/bed mobility training/stair negotiation: GOAL: Pt will be able to negotiate 10 steps +HR independently with reciprocal pattern in 2 weeks.

## 2018-08-19 NOTE — PHYSICAL THERAPY INITIAL EVALUATION ADULT - PERTINENT HX OF CURRENT PROBLEM, REHAB EVAL
Pt is a 87 y/o male admitted to [facility] on 8/16/18 PMH significant for dementia , HTN , pulmonary fibrosis , cad s/p CABG (’95) and s/p stent x 2 , and  atrial fibrillation on Xarelto, who presents for anemia found on outpatient labs. Per Patients daughter, patient has chronic dyspnea on exertion secondary to pulmonary fibrosis , however for the past several weeks he was noticeably more fatigued and had increased dyspnea.

## 2018-08-19 NOTE — PHYSICAL THERAPY INITIAL EVALUATION ADULT - PRECAUTIONS/LIMITATIONS, REHAB EVAL
. He had no abdominal pain , no hematochezia and no melena. Patient was diagnosed with Afib this past December and cardioverted at that time and subsequently been on Xarelto without bleeding incident. Patient also on baby aspirin. No NSIAD use. Per daughter, most recent hgb in March '18 was about 11.3 , currently around 8. Patient currently reports he feels fine, no active complaints.

## 2018-08-20 ENCOUNTER — TRANSCRIPTION ENCOUNTER (OUTPATIENT)
Age: 83
End: 2018-08-20

## 2018-08-20 LAB
ANION GAP SERPL CALC-SCNC: 14 MMOL/L — SIGNIFICANT CHANGE UP (ref 5–17)
BUN SERPL-MCNC: 19 MG/DL — SIGNIFICANT CHANGE UP (ref 7–23)
CALCIUM SERPL-MCNC: 8.9 MG/DL — SIGNIFICANT CHANGE UP (ref 8.4–10.5)
CHLORIDE SERPL-SCNC: 107 MMOL/L — SIGNIFICANT CHANGE UP (ref 96–108)
CO2 SERPL-SCNC: 19 MMOL/L — LOW (ref 22–31)
CREAT SERPL-MCNC: 1.75 MG/DL — HIGH (ref 0.5–1.3)
GLUCOSE SERPL-MCNC: 94 MG/DL — SIGNIFICANT CHANGE UP (ref 70–99)
HCT VFR BLD CALC: 25.4 % — LOW (ref 39–50)
HGB BLD-MCNC: 8.2 G/DL — LOW (ref 13–17)
MCHC RBC-ENTMCNC: 30.9 PG — SIGNIFICANT CHANGE UP (ref 27–34)
MCHC RBC-ENTMCNC: 32.3 GM/DL — SIGNIFICANT CHANGE UP (ref 32–36)
MCV RBC AUTO: 95.8 FL — SIGNIFICANT CHANGE UP (ref 80–100)
PLATELET # BLD AUTO: 214 K/UL — SIGNIFICANT CHANGE UP (ref 150–400)
POTASSIUM SERPL-MCNC: 4.3 MMOL/L — SIGNIFICANT CHANGE UP (ref 3.5–5.3)
POTASSIUM SERPL-SCNC: 4.3 MMOL/L — SIGNIFICANT CHANGE UP (ref 3.5–5.3)
RBC # BLD: 2.65 M/UL — LOW (ref 4.2–5.8)
RBC # FLD: 16.2 % — HIGH (ref 10.3–14.5)
SODIUM SERPL-SCNC: 140 MMOL/L — SIGNIFICANT CHANGE UP (ref 135–145)
WBC # BLD: 4.42 K/UL — SIGNIFICANT CHANGE UP (ref 3.8–10.5)
WBC # FLD AUTO: 4.42 K/UL — SIGNIFICANT CHANGE UP (ref 3.8–10.5)

## 2018-08-20 PROCEDURE — 99233 SBSQ HOSP IP/OBS HIGH 50: CPT

## 2018-08-20 PROCEDURE — 45378 DIAGNOSTIC COLONOSCOPY: CPT | Mod: GC

## 2018-08-20 PROCEDURE — 44360 SMALL BOWEL ENDOSCOPY: CPT | Mod: GC

## 2018-08-20 RX ORDER — FERROUS SULFATE 325(65) MG
325 TABLET ORAL DAILY
Qty: 0 | Refills: 0 | Status: DISCONTINUED | OUTPATIENT
Start: 2018-08-20 | End: 2018-08-21

## 2018-08-20 RX ADMIN — PANTOPRAZOLE SODIUM 40 MILLIGRAM(S): 20 TABLET, DELAYED RELEASE ORAL at 18:24

## 2018-08-20 RX ADMIN — AMIODARONE HYDROCHLORIDE 200 MILLIGRAM(S): 400 TABLET ORAL at 05:39

## 2018-08-20 RX ADMIN — LATANOPROST 1 DROP(S): 0.05 SOLUTION/ DROPS OPHTHALMIC; TOPICAL at 22:11

## 2018-08-20 RX ADMIN — UMECLIDINIUM BROMIDE AND VILANTEROL TRIFENATATE 1 PUFF(S): 62.5; 25 POWDER RESPIRATORY (INHALATION) at 14:21

## 2018-08-20 RX ADMIN — Medication 25 MILLIGRAM(S): at 05:39

## 2018-08-20 RX ADMIN — Medication 1000 UNIT(S): at 14:19

## 2018-08-20 RX ADMIN — ATORVASTATIN CALCIUM 20 MILLIGRAM(S): 80 TABLET, FILM COATED ORAL at 22:11

## 2018-08-20 RX ADMIN — DONEPEZIL HYDROCHLORIDE 5 MILLIGRAM(S): 10 TABLET, FILM COATED ORAL at 22:11

## 2018-08-20 RX ADMIN — PANTOPRAZOLE SODIUM 40 MILLIGRAM(S): 20 TABLET, DELAYED RELEASE ORAL at 05:39

## 2018-08-20 RX ADMIN — Medication 325 MILLIGRAM(S): at 18:27

## 2018-08-20 NOTE — DISCHARGE NOTE ADULT - HOSPITAL COURSE
89yo M pmh dementia , HTN , pulmonary fibrosis , CAD s/p CABG (’95) and s/p stent x 2 , and parox atrial fibrillation on xarelto p/w incr SOB, lethargy adm with symptomatic anemia pending EGD/cscope 8/21, found JASON on CKD.     Problem/Plan - 1:  ·  Problem: Anemia.  Plan: hgb decreased to 8.3 from 11 in 3/2018.  Labs show normocytic anemia, folate/b12 normal, iron studies c/w iron def anemia. Rectal with brown stool per GI eval. Suspect bleed in 3/2018 now without active bleed  hgb stable ~8  - Hold Xarelto  - hold ASA  - po protonix per GI     Problem/Plan - 2:  ·  Problem: JASON (acute kidney injury).  Plan: on CKD BL Cr 1.1-1.3. Per daughter Cr 1.4 last 4/2018 and was uprising w/ diuretics.  - hold lasix 40 mg po home dose for now  - hold lisinopril given jason  - strict ins and outs - urinating well  - monitor bmp daily  -    Problem/Plan - 3:  ·  Problem: Atrial fibrillation, unspecified type.  Plan: CHADSVasc 3, on xarelto at home  - holding xarelto 2/2 GIB  - continue amiodarone   - decreased metoprolol dose to 25 given bradycardia     Problem/Plan - 4:  ·  Problem: Dementia.  Plan: - continue Aricept.      Problem/Plan - 5:  ·  Problem: CAD (coronary artery disease).  Plan: - holding ASA in setting of possible GI bleeding   - continue statin, metoprolol.      Problem/Plan - 6:  Problem: Pulmonary fibrosis. Plan: - continue Anoro Ellipta   - N-Acetylcysteine not on formulary.     Problem/Plan - 7:  ·  Problem: HTN (hypertension).  Plan: bp controlled  - holding lisinopril can resume if BP and renal function improved.      Problem/Plan - 8:  ·  Problem: CHF (congestive heart failure).  Plan: no prior TTE on record  - holding lasix given JASON , can resume as needed  - check TTE.      Problem/Plan - 9:  ·  Problem: Preventive measure.  Plan: DVT ppx: SCDs given bleed  - PT eval - home pt  -  Pt stable discharged home with outpatient follow up PMD. and Gastroenterologist. 87yo M pmh dementia , HTN , pulmonary fibrosis , CAD s/p CABG (’95) and s/p stent x 2 , and parox atrial fibrillation on xarelto p/w incr SOB, lethargy adm with symptomatic anemia pending EGD/cscope 8/21, found JASON on CKD.     Problem/Plan - 1:  ·  Problem: Anemia.  Plan: hgb decreased to 8.3 from 11 in 3/2018.  Labs show normocytic anemia, folate/b12 normal, iron studies c/w iron def anemia. Rectal with brown stool per GI eval. Suspect bleed in 3/2018 now without active bleed  hgb stable ~8  - Hold Xarelto  - hold ASA  - po protonix per GI     Problem/Plan - 2:  ·  Problem: JASON (acute kidney injury).  Plan: on CKD BL Cr 1.1-1.3. Per daughter Cr 1.4 last 4/2018 and was uprising w/ diuretics.  - hold lasix 40 mg po home dose for now  - hold lisinopril given jason  - strict ins and outs - urinating well  - monitor bmp daily  -    Problem/Plan - 3:  ·  Problem: Atrial fibrillation, unspecified type.  Plan: CHADSVasc 3, on xarelto at home  - holding xarelto 2/2 GIB  - continue amiodarone   - decreased metoprolol dose to 25 given bradycardia     Problem/Plan - 4:  ·  Problem: Dementia.  Plan: - continue Aricept.      Problem/Plan - 5:  ·  Problem: CAD (coronary artery disease).  Plan: - holding ASA in setting of possible GI bleeding   - continue statin, metoprolol.      Problem/Plan - 6:  Problem: Pulmonary fibrosis. Plan: - continue Anoro Ellipta   - N-Acetylcysteine not on formulary.     Problem/Plan - 7:  ·  Problem: HTN (hypertension).  Plan: bp controlled  - holding lisinopril can resume if BP and renal function improved.      Problem/Plan - 8:  ·  Problem: CHF (congestive heart failure).  Plan: no prior TTE on record  - holding lasix given JASON , can resume as needed  - check TTE.      Problem/Plan - 9:  ·  Problem: Preventive measure.  Plan: DVT ppx: SCDs given bleed  - PT eval - home pt: Family declined services.   -  Pt stable discharged home with outpatient follow up PMD. and Gastroenterologist. 87yo M pmh dementia , HTN , pulmonary fibrosis , CAD s/p CABG (’95) and s/p stent x 2 , and parox atrial fibrillation on xarelto p/w incr SOB, lethargy adm with symptomatic anemia s/p EGD/cscope 8/21, found JASON on CKD. Anemia hgb stable ~8 during admission, decr to ~7 on 8/21 lab error as repeat hgb >8. Xarelto held and recommended to continue to hold until w/ cardiology. Pt received egd wnl, cscope showing no active bleed w/ nonbleeding internal hemmorrhoids and diverticulosis. Pt noted to have JASON from baseline Cr 1.3 few months ago, suspect was related to diuretics received as outpt and pre-renal JASON. Lasix held during admission w/ mild improvement in Cr, renal u/s showing medical renal disease. Lisinopril held. Metoprolol decr given bradycardia and pt tolerated new dose without afib rvr. Started on iron tab for iron def anemia, recommended to f/u with GI and PCP as outpatient - celiac panel sent. Seen by PT recommended for home PT and family declined.  No active bleeding while in hospital. Stable for d/c home.

## 2018-08-20 NOTE — PROGRESS NOTE ADULT - PROBLEM SELECTOR PROBLEM 2
JASON (acute kidney injury)

## 2018-08-20 NOTE — PROGRESS NOTE ADULT - PROBLEM SELECTOR PLAN 9
DVT ppx: SCDs given bleed  - PT eval - home pt  - dispo pending EGD/cscope monday, d/c planning possible tuesday to home.  - spoke with daughter Karen Boyd at bedside 8/20 and updated on plan, all ques answered
icd's  PT eval
DVT ppx: SCDs given bleed  - PT eval  - dispo pending EGD/cscope monday
DVT ppx: SCDs given bleed  - PT eval  - dispo pending EGD/cscope monday  - spoke can called daughter Karen Jones over phone 8/19, all ques answered

## 2018-08-20 NOTE — DISCHARGE NOTE ADULT - CARE PLAN
Principal Discharge DX:	GI bleed  Goal:	Patient remains hemodynamically stable.  Assessment and plan of treatment:	There are 2 common types of GI Bleed, Upper GI Bleed and Lower GI Bleed.  Upper GI Bleed affects the esophagus, stomach, and first part of the small intestine. Lower GI Bleed affects the colon and rectum.  Upper GI Bleed signs and symptoms to notify your Health Care Provider are vomiting blood, or coffee ground vomitus, and bowel movements that look like black tar.  Lower GI Bleed signs and symptoms to notify your health care provider are bright red bloody bowel movements.   Take your medications as prescribed by your Gastroenterologist.  If you have had an Endoscopy or Colonoscopy, follow up with your Gastroenterologist for Pathology results.  Avoid NSAIDs unless your Health Care Provider tells you that it is ok (Aspirin, Ibuprofen, Advil, Motrin, Aleve).  Follow up with your Gastroenterologist within 1-2 weeks of discharge.  Secondary Diagnosis:	HTN (hypertension)  Assessment and plan of treatment:	Low salt diet  Activity as tolerated.  Take all medication as prescribed.  Follow up with your medical doctor for routine blood pressure monitoring at your next visit.  Notify your doctor if you have any of the following symptoms:   Dizziness, Lightheadedness, Blurry vision, Headache, Chest pain, Shortness of breath  Secondary Diagnosis:	CAD (coronary artery disease)  Assessment and plan of treatment:	Coronary artery disease is a condition where the arteries the supply the heart muscle get clogges with fatty deposits & puts you at risk for a heart attack  Call your doctor if you have any new pain, pressure, or discomfort in the center of your chest, pain, tingling or discomfort in arms, back, neck, jaw, or stomach, shortness of breath, nausea, vomiting, burping or heartburn, sweating, cold and clammy skin, racing or abnormal heartbeat for more than 10 minutes or if they keep coming & going.  Call 911 and do not tr to get to hospital by care  You can help yourself with lefestyle changes (quitting smoking if you smoke), eat lots of fruits & vegetables & low fat dairy products, not a lot of meat & fatty foods, walk or some form of physical activity most days of the week, lose weight if you are overweight  Take your cardiac medication as prescribed to lower cholesterol, to lower blood pressure, aspirin to prevent blood clots, and diabetes control  Make sure to keep appointments with doctor for cardiac follow up care  Secondary Diagnosis:	CHF (congestive heart failure)  Assessment and plan of treatment:	Weigh yourself daily.  If you gain 3lbs in 3 days, or 5lbs in a week call your Health Care Provider.  Do not eat or drink foods containing more than 2000mg of salt (sodium) in your diet every day.  Call your Health Care Provider if you have any swelling or increased swelling in your feet, ankles, and/or stomach.  Take all of your medication as directed.  If you become dizzy call your Health Care Provider.  Secondary Diagnosis:	Atrial fibrillation, unspecified type  Assessment and plan of treatment:	Atrial fibrillation is the most common heart rhythm problem & has the risk of stroke & heart attack  It helps if you control your blood pressure, not drink more than 1-2 alcohol drinks per day, cut down on caffeine, getting treatment for over active thyroid gland, & getting exercise  Call your doctor if you feel your heart racing or beating unusually, chest tightness or pain, lightheaded, faint, shortness of breath especially with exercise  It is important to take your heart medication as prescribed  You may be on anticoagulation which is very important to take as directed - you may need blood work to monitor drug levels

## 2018-08-20 NOTE — DISCHARGE NOTE ADULT - CARE PROVIDERS DIRECT ADDRESSES
[email or web address]

## 2018-08-20 NOTE — DISCHARGE NOTE ADULT - MEDICATION SUMMARY - MEDICATIONS TO TAKE
I will START or STAY ON the medications listed below when I get home from the hospital:    Ecotrin  -- 81 milligram(s) by mouth once a day  -- Indication: For Atrial fibrillation, unspecified type    amiodarone 200 mg oral tablet  -- 1 tab(s) by mouth once a day  -- Indication: For Atrial fibrillation, unspecified type    atorvastatin 20 mg oral tablet  -- 1 tab(s) by mouth once a day (at bedtime)  -- Indication: For High Cholesterol    metoprolol succinate 25 mg oral tablet, extended release  -- 1 tab(s) by mouth once a day  -- Indication: For Atrial fibrillation, unspecified type    Anoro Ellipta 62.5 mcg-25 mcg/inh inhalation powder  -- 1 puff(s) inhaled once a day MDD:1  -- Check with your doctor before becoming pregnant.  For inhalation only.  It is very important that you take or use this exactly as directed.  Do not skip doses or discontinue unless directed by your doctor.  Obtain medical advice before taking any non-prescription drugs as some may affect the action of this medication.    -- Indication: For Inhaler    donepezil 5 mg oral tablet  -- 1 tab(s) by mouth once a day (at bedtime)  -- Indication: For Dementia    ferrous sulfate 325 mg (65 mg elemental iron) oral delayed release tablet  -- 1 tab(s) by mouth once a day   -- May discolor urine or feces.  Swallow whole.  Do not crush.    -- Indication: For Vitamin    docusate sodium 100 mg oral capsule  -- 1 cap(s) by mouth once a day  -- Indication: For Laxative    latanoprost 0.005% ophthalmic solution  -- 1 drop(s) to each affected eye once a day (in the evening)  -- Indication: For eye drops    Centrum Silver oral tablet  -- 1 tab(s) by mouth once a day  -- Indication: For vitamin    Vitamin D3 2000 intl units oral capsule  -- 1 cap(s) by mouth once a day  -- Indication: For vitamin I will START or STAY ON the medications listed below when I get home from the hospital:    Ecotrin  -- 81 milligram(s) by mouth once a day  -- Indication: For CAD (coronary artery disease)    amiodarone 200 mg oral tablet  -- 1 tab(s) by mouth once a day  -- Indication: For Atrial fibrillation, unspecified type    atorvastatin 20 mg oral tablet  -- 1 tab(s) by mouth once a day (at bedtime)  -- Indication: For High Cholesterol    metoprolol succinate 25 mg oral tablet, extended release  -- 1 tab(s) by mouth once a day  -- Indication: For Atrial fibrillation, unspecified type    Anoro Ellipta 62.5 mcg-25 mcg/inh inhalation powder  -- 1 puff(s) inhaled once a day MDD:1  -- Check with your doctor before becoming pregnant.  For inhalation only.  It is very important that you take or use this exactly as directed.  Do not skip doses or discontinue unless directed by your doctor.  Obtain medical advice before taking any non-prescription drugs as some may affect the action of this medication.    -- Indication: For Inhaler    donepezil 5 mg oral tablet  -- 1 tab(s) by mouth once a day (at bedtime)  -- Indication: For Dementia    ferrous sulfate 325 mg (65 mg elemental iron) oral delayed release tablet  -- 1 tab(s) by mouth once a day   -- May discolor urine or feces.  Swallow whole.  Do not crush.    -- Indication: For Vitamin    docusate sodium 100 mg oral capsule  -- 1 cap(s) by mouth once a day  -- Indication: For Laxative    latanoprost 0.005% ophthalmic solution  -- 1 drop(s) to each affected eye once a day (in the evening)  -- Indication: For Topical agent    Centrum Silver oral tablet  -- 1 tab(s) by mouth once a day  -- Indication: For Vitamin    Vitamin D3 2000 intl units oral capsule  -- 1 cap(s) by mouth once a day  -- Indication: For Vitamin

## 2018-08-20 NOTE — PROGRESS NOTE ADULT - PROBLEM SELECTOR PLAN 2
baseline from 2016 is 1.02.  Maybe 2/2 to lasix vs. blood loss.  - hold lasix   - hold lisinopril   - strict ins and outs   - monitor bmp daily  -check bladder sono to r/o obstruction  -check renal sono  -avoid nephrotoxic meds
on CKD BL Cr 1.1-1.3. Per daughter Cr 1.4 last 4/2018 and was uprising w/ diuretics.  - hold lasix 40 mg po home dose for now  - hold lisinopril given paige  - strict ins and outs   - monitor bmp daily  - check renal/bladder sono  - avoid nephrotoxic meds
on CKD BL Cr 1.1-1.3. Unclear etiology.  - hold lasix 40 mg po home dose for now  - hold lisinopril given paige  - strict ins and outs   - monitor bmp daily  - check renal/bladder sono  - avoid nephrotoxic meds
on CKD BL Cr 1.1-1.3. Per daughter Cr 1.4 last 4/2018 and was uprising w/ diuretics.  - hold lasix 40 mg po home dose for now  - hold lisinopril given paige  - strict ins and outs - urinating well  - monitor bmp daily  - check renal/bladder sono  - avoid nephrotoxic meds

## 2018-08-20 NOTE — DISCHARGE NOTE ADULT - CARE PROVIDER_API CALL
Ambrose Handley), Internal Medicine  [address] [facility]  [address]  [place] [address]  Phone: [phone]  Fax: [phone]    Alex Fernandez), Internal Medicine  [address] [place] [address]  Phone: [phone]  Fax: [phone]

## 2018-08-20 NOTE — DISCHARGE NOTE ADULT - ADDITIONAL INSTRUCTIONS
Follow up with Cardiology within 1 week of discharge for outpatient Echocardiogram. Restart anticoagulation once cleared by your doctor.  Follow up with PMD within 1 week of discharge for repeat BMP.

## 2018-08-20 NOTE — PROGRESS NOTE ADULT - ATTENDING COMMENTS
CESAR ASHBY MD  HOSPITALIST  #[phone]
Beeper: [phone]    discussed above with Daughter Karen Boyd ([phone]) on phone
CESAR ASHBY MD  HOSPITALIST  #[phone]
CESAR ASHBY MD  HOSPITALIST  #[phone]

## 2018-08-20 NOTE — DISCHARGE NOTE ADULT - SECONDARY DIAGNOSIS.
HTN (hypertension) CAD (coronary artery disease) CHF (congestive heart failure) Atrial fibrillation, unspecified type

## 2018-08-20 NOTE — DISCHARGE NOTE ADULT - PATIENT PORTAL LINK FT
You can access the [facility] Patient Portal, offered by [facility], by registering with the following website: [email or web address]

## 2018-08-20 NOTE — PROGRESS NOTE ADULT - PROBLEM SELECTOR PLAN 1
Dr Handley (pcp) does not have baseline labs as he just met patient 8/15.  Per daughter Karen Boyd (RN), hgb decreased to 8.3 from 11 in 3/2018.  Labs show normocytic anemia, folate/b12 normal.  Rectal with brown stool per GI eval.  Case discussed with GI Dr Guerra.  Suspect he bled sometime between March and now but not actively bleeding at this time.  - Hold Xarelto  - hold ASA  - d/c protonix gtt and transition to po protonix  -regular diet  -plan for endoscopy/c-scope on Monday 8/20.  -check ferritin, retic count  -trend cbc daily or with active bleed
hgb decreased to 8.3 from 11 in 3/2018.  Labs show normocytic anemia, folate/b12 normal, iron studies c/w iron def anemia. Rectal with brown stool per GI eval. Suspect bleed in 3/2018 now without active bleed  hgb stable ~8  - Hold Xarelto  - hold ASA  - d/c protonix gtt and transition to po protonix  - clear diet today, for moviprep tomorrow, NPO MN sunday  - plan for endoscopy/c-scope on Monday 8/20.  - trend cbc daily or with active bleed
hgb decreased to 8.3 from 11 in 3/2018.  Labs show normocytic anemia, folate/b12 normal, iron studies c/w iron def anemia. Rectal with brown stool per GI eval. Suspect bleed in 3/2018 now without active bleed  hgb stable ~8  - Hold Xarelto  - hold ASA  - po protonix per GI  - clear diet today, for moviprep today, NPO MN today  - plan for endoscopy/c-scope on Monday 8/20.  - trend cbc daily or with active bleed
hgb decreased to 8.3 from 11 in 3/2018.  Labs show normocytic anemia, folate/b12 normal, iron studies c/w iron def anemia. Rectal with brown stool per GI eval. Suspect bleed in 3/2018 now without active bleed  hgb stable ~8  - Hold Xarelto  - hold ASA  - po protonix per GI  - NPO for endoscopy/c-scope today, f/u report  - trend cbc daily or with active bleed

## 2018-08-20 NOTE — DISCHARGE NOTE ADULT - PLAN OF CARE
Patient remains hemodynamically stable. There are 2 common types of GI Bleed, Upper GI Bleed and Lower GI Bleed.  Upper GI Bleed affects the esophagus, stomach, and first part of the small intestine. Lower GI Bleed affects the colon and rectum.  Upper GI Bleed signs and symptoms to notify your Health Care Provider are vomiting blood, or coffee ground vomitus, and bowel movements that look like black tar.  Lower GI Bleed signs and symptoms to notify your health care provider are bright red bloody bowel movements.   Take your medications as prescribed by your Gastroenterologist.  If you have had an Endoscopy or Colonoscopy, follow up with your Gastroenterologist for Pathology results.  Avoid NSAIDs unless your Health Care Provider tells you that it is ok (Aspirin, Ibuprofen, Advil, Motrin, Aleve).  Follow up with your Gastroenterologist within 1-2 weeks of discharge. Low salt diet  Activity as tolerated.  Take all medication as prescribed.  Follow up with your medical doctor for routine blood pressure monitoring at your next visit.  Notify your doctor if you have any of the following symptoms:   Dizziness, Lightheadedness, Blurry vision, Headache, Chest pain, Shortness of breath Coronary artery disease is a condition where the arteries the supply the heart muscle get clogges with fatty deposits & puts you at risk for a heart attack  Call your doctor if you have any new pain, pressure, or discomfort in the center of your chest, pain, tingling or discomfort in arms, back, neck, jaw, or stomach, shortness of breath, nausea, vomiting, burping or heartburn, sweating, cold and clammy skin, racing or abnormal heartbeat for more than 10 minutes or if they keep coming & going.  Call 911 and do not tr to get to hospital by care  You can help yourself with lefestyle changes (quitting smoking if you smoke), eat lots of fruits & vegetables & low fat dairy products, not a lot of meat & fatty foods, walk or some form of physical activity most days of the week, lose weight if you are overweight  Take your cardiac medication as prescribed to lower cholesterol, to lower blood pressure, aspirin to prevent blood clots, and diabetes control  Make sure to keep appointments with doctor for cardiac follow up care Weigh yourself daily.  If you gain 3lbs in 3 days, or 5lbs in a week call your Health Care Provider.  Do not eat or drink foods containing more than 2000mg of salt (sodium) in your diet every day.  Call your Health Care Provider if you have any swelling or increased swelling in your feet, ankles, and/or stomach.  Take all of your medication as directed.  If you become dizzy call your Health Care Provider. Atrial fibrillation is the most common heart rhythm problem & has the risk of stroke & heart attack  It helps if you control your blood pressure, not drink more than 1-2 alcohol drinks per day, cut down on caffeine, getting treatment for over active thyroid gland, & getting exercise  Call your doctor if you feel your heart racing or beating unusually, chest tightness or pain, lightheaded, faint, shortness of breath especially with exercise  It is important to take your heart medication as prescribed  You may be on anticoagulation which is very important to take as directed - you may need blood work to monitor drug levels

## 2018-08-20 NOTE — PROGRESS NOTE ADULT - SUBJECTIVE AND OBJECTIVE BOX
Patient is a 88y old  Male who presents with a chief complaint of anemia (17 Aug 2018 01:39)      SUBJECTIVE / OVERNIGHT EVENTS:  no acute events overnight  denies CP/SOB/n/v/abdominal pain/diarrhea/constipation/brbpr/melena/bleeding    tele reviewed: sinus bora 50-60    MEDICATIONS  (STANDING):  amiodarone    Tablet 200 milliGRAM(s) Oral daily  atorvastatin 20 milliGRAM(s) Oral at bedtime  cholecalciferol 1000 Unit(s) Oral daily  donepezil 5 milliGRAM(s) Oral at bedtime  latanoprost 0.005% Ophthalmic Solution 1 Drop(s) Both EYES at bedtime  metoprolol succinate ER 25 milliGRAM(s) Oral daily  pantoprazole    Tablet 40 milliGRAM(s) Oral two times a day  umeclidinium 62.5 MICROgram(s)/vilanterol 25 MICROgram(s) Inhaler 1 Puff(s) Inhalation daily    MEDICATIONS  (PRN):  acetaminophen   Tablet. 650 milliGRAM(s) Oral every 6 hours PRN Mild Pain (1 - 3)      Vital Signs Last 24 Hrs  T(C): 37.1 (17 Aug 2018 04:26), Max: 37.1 (17 Aug 2018 04:26)  T(F): 98.7 (17 Aug 2018 04:26), Max: 98.7 (17 Aug 2018 04:26)  HR: 57 (17 Aug 2018 04:26) (24 - 57)  BP: 152/65 (17 Aug 2018 04:26) (134/42 - 160/57)  BP(mean): --  RR: 18 (17 Aug 2018 04:26) (18 - 24)  SpO2: 96% (17 Aug 2018 04:26) (93% - 99%)  CAPILLARY BLOOD GLUCOSE        I&O's Summary    16 Aug 2018 07:01  -  17 Aug 2018 07:00  --------------------------------------------------------  IN: 60 mL / OUT: 250 mL / NET: -190 mL        PHYSICAL EXAM:  GENERAL: NAD, well-developed, hard of hearing  HEAD:  Atraumatic, Normocephalic  EYES: EOMI, conjunctiva and sclera clear  NECK: Supple, No JVD  CHEST/LUNG: Clear to auscultation bilaterally; No wheeze, no accessory muscles  HEART: Regular rate and rhythm; No murmurs, rubs, or gallops  ABDOMEN: Soft, Nontender, Nondistended; Bowel sounds present; no suprapubic tenderness  EXTREMITIES:  2+ Peripheral Pulses, No clubbing, cyanosis, or edema  PSYCH: AAOx2.5 (knows year (not month/season), location (but not name of hospital), and self)  NEUROLOGY: non-focal; fluent speech      LABS:                        7.9    4.75  )-----------( 213      ( 17 Aug 2018 06:58 )             25.2     08-17    139  |  104  |  23  ----------------------------<  89  4.5   |  26  |  1.74<H>    Ca    8.7      17 Aug 2018 05:59    TPro  6.1  /  Alb  3.4  /  TBili  1.0  /  DBili  x   /  AST  18  /  ALT  6<L>  /  AlkPhos  69  08-17    PTT - ( 16 Aug 2018 20:06 )  PTT:36.9 sec          RADIOLOGY & ADDITIONAL TESTS:    Imaging Personally Reviewed:    Consultant(s) Notes Reviewed:  GI    Care Discussed with Consultants/Other Providers: Dr Guerra (GI), Dr Handley (PCP)
Chief Complaint:  Patient is a 88y old  Male who presents with a chief complaint of anemia (17 Aug 2018 01:39)      Interval Events: No events over the weekend.  Plan for EGd/colon tomorrow.  CLD today and prep tonight.     Allergies:  No Known Allergies      Hospital Medications:  acetaminophen   Tablet. 650 milliGRAM(s) Oral every 6 hours PRN  amiodarone    Tablet 200 milliGRAM(s) Oral daily  atorvastatin 20 milliGRAM(s) Oral at bedtime  cholecalciferol 1000 Unit(s) Oral daily  donepezil 5 milliGRAM(s) Oral at bedtime  latanoprost 0.005% Ophthalmic Solution 1 Drop(s) Both EYES at bedtime  metoprolol succinate ER 25 milliGRAM(s) Oral daily  pantoprazole    Tablet 40 milliGRAM(s) Oral two times a day  umeclidinium 62.5 MICROgram(s)/vilanterol 25 MICROgram(s) Inhaler 1 Puff(s) Inhalation daily      PMHX/PSHX:  Pulmonary fibrosis  Atrial fibrillation, unspecified type  Dementia  HTN (hypertension)  Inguinal Hernia  High Cholesterol  Coronary atherosclerosis of unspecified type of vessel, native or graft  S/P CABG (Coronary Artery Bypass Graft)  s/p Angioplasty with Stent      Family history:  Family history of stomach cancer (Sibling)      ROS:     General:  No wt loss, fevers, chills, night sweats, fatigue,   Eyes:  Good vision, no reported pain  ENT:  No sore throat, pain, runny nose, dysphagia  CV:  No pain, palpitations, hypo/hypertension  Resp:  No dyspnea, cough, tachypnea, wheezing  GI:  See HPI  :  No pain, bleeding, incontinence, nocturia  Muscle:  No pain, weakness  Neuro:  No weakness, tingling, memory problems  Psych:  No fatigue, insomnia, mood problems, depression  Endocrine:  No polyuria, polydipsia, cold/heat intolerance  Heme:  No petechiae, ecchymosis, easy bruisability  Skin:  No rash, edema      PHYSICAL EXAM:     GENERAL:  Appears stated age, well-groomed, well-nourished, no distress  HEENT:  NC/AT,  conjunctivae clear, sclera -anicteric  CHEST:  Full & symmetric excursion, no increased effort  ABDOMEN:  Soft, non-tender, non-distended  EXTREMITIES:  no cyanosis,clubbing or edema  SKIN:  No rash  NEURO:  Alert, oriented    Vital Signs:  Vital Signs Last 24 Hrs  T(C): 36.4 (19 Aug 2018 06:07), Max: 36.9 (18 Aug 2018 17:46)  T(F): 97.6 (19 Aug 2018 06:07), Max: 98.5 (18 Aug 2018 17:46)  HR: 64 (19 Aug 2018 06:07) (60 - 65)  BP: 128/68 (19 Aug 2018 06:07) (117/57 - 134/51)  BP(mean): --  RR: 16 (19 Aug 2018 06:07) (16 - 18)  SpO2: 96% (19 Aug 2018 06:07) (92% - 97%)  Daily     Daily     LABS:                        8.2    5.17  )-----------( 238      ( 18 Aug 2018 08:28 )             26.6     08-18    135  |  100  |  26<H>  ----------------------------<  102<H>  4.7   |  24  |  1.79<H>    Ca    9.0      18 Aug 2018 06:21  Phos  3.3     08-18  Mg     2.1     08-18                Imaging:    reviewed
Patient is a 88y old  Male who presents with a chief complaint of anemia (17 Aug 2018 01:39)        SUBJECTIVE / OVERNIGHT EVENTS:  overnight no acute events.  per rn no bms since 8/17.  pt denies complaints.    CAPILLARY BLOOD GLUCOSE    I&O's Summary    18 Aug 2018 07:01  -  19 Aug 2018 07:00  --------------------------------------------------------  IN: 1200 mL / OUT: 875 mL / NET: 325 mL    19 Aug 2018 07:01  -  19 Aug 2018 14:55  --------------------------------------------------------  IN: 2440 mL / OUT: 850 mL / NET: 1590 mL        Vital Signs Last 24 Hrs  T(C): 36.3 (19 Aug 2018 14:21), Max: 36.9 (18 Aug 2018 17:46)  T(F): 97.4 (19 Aug 2018 14:21), Max: 98.5 (18 Aug 2018 17:46)  HR: 59 (19 Aug 2018 14:21) (59 - 73)  BP: 136/70 (19 Aug 2018 14:21) (117/57 - 136/70)  BP(mean): --  RR: 16 (19 Aug 2018 14:21) (16 - 18)  SpO2: 97% (19 Aug 2018 14:21) (92% - 97%)    PHYSICAL EXAM:  GENERAL:  Well appearing, elderly M, [place], in NAD  HEAD:  NCAT  EYES: PERRLA, conjunctiva clear  NECK: Supple  CHEST/LUNG: CTA B/L. No w/r/r.  HEART: bradycardic. Normal S1, S2. II/VI systollic murmur.  ABDOMEN: SNTND. Bowel sounds present  EXTREMITIES:  2+ Peripheral Pulses, No clubbing, cyanosis.  Trace LE edema  PSYCH: appropriate affect    LABS:                        8.1    3.87  )-----------( 233      ( 19 Aug 2018 09:24 )             26.1     08-19    137  |  102  |  19  ----------------------------<  101<H>  5.0   |  23  |  1.82<H>    Ca    9.1      19 Aug 2018 07:48  Phos  3.3     08-18  Mg     2.1     08-18    RADIOLOGY & ADDITIONAL TESTS:  Consultant(s) Notes Reviewed:  GI  Care Discussed with Consultants/Other Providers: Floor NP    MEDICATIONS  (STANDING):  amiodarone    Tablet 200 milliGRAM(s) Oral daily  atorvastatin 20 milliGRAM(s) Oral at bedtime  cholecalciferol 1000 Unit(s) Oral daily  donepezil 5 milliGRAM(s) Oral at bedtime  latanoprost 0.005% Ophthalmic Solution 1 Drop(s) Both EYES at bedtime  metoprolol succinate ER 25 milliGRAM(s) Oral daily  pantoprazole    Tablet 40 milliGRAM(s) Oral two times a day  polyethylene glycol/electrolyte Solution 1000 milliLiter(s) Oral every 4 hours  umeclidinium 62.5 MICROgram(s)/vilanterol 25 MICROgram(s) Inhaler 1 Puff(s) Inhalation daily    MEDICATIONS  (PRN):  acetaminophen   Tablet. 650 milliGRAM(s) Oral every 6 hours PRN Mild Pain (1 - 3)
Patient is a 88y old  Male who presents with a chief complaint of anemia (17 Aug 2018 01:39)        SUBJECTIVE / OVERNIGHT EVENTS:  overnight no acute events.  pt asking why he is in the hospital, states he needs to speak w/ his daughter.  denies cp, sob, f/chills, abd pain, blood loss.    CAPILLARY BLOOD GLUCOSE    I&O's Summary    17 Aug 2018 07:01  -  18 Aug 2018 07:00  --------------------------------------------------------  IN: 980 mL / OUT: 750 mL / NET: 230 mL    18 Aug 2018 07:01  -  18 Aug 2018 14:56  --------------------------------------------------------  IN: 0 mL / OUT: 400 mL / NET: -400 mL    Vital Signs Last 24 Hrs  T(C): 36.4 (18 Aug 2018 04:28), Max: 36.4 (17 Aug 2018 20:26)  T(F): 97.6 (18 Aug 2018 04:28), Max: 97.6 (17 Aug 2018 20:26)  HR: 62 (18 Aug 2018 04:28) (55 - 62)  BP: 148/67 (18 Aug 2018 04:28) (133/75 - 148/67)  BP(mean): --  RR: 18 (18 Aug 2018 04:28) (17 - 18)  SpO2: 95% (18 Aug 2018 04:28) (95% - 96%)    PHYSICAL EXAM:  GENERAL:  Well appearing, elderly M, [place], in NAD  HEAD:  NCAT  EYES: PERRLA, conjunctiva clear  NECK: Supple  CHEST/LUNG: CTA B/L. No w/r/r.  HEART: bradycardic. Normal S1, S2. II/VI systollic murmur.  ABDOMEN: SNTND. Bowel sounds present  EXTREMITIES:  2+ Peripheral Pulses, No clubbing, cyanosis.  Trace LE edema  PSYCH: appropriate affect    LABS:                        8.2    5.17  )-----------( 238      ( 18 Aug 2018 08:28 )             26.6     08-18    135  |  100  |  26<H>  ----------------------------<  102<H>  4.7   |  24  |  1.79<H>    Ca    9.0      18 Aug 2018 06:21  Phos  3.3     08-18  Mg     2.1     08-18    TPro  6.1  /  Alb  3.4  /  TBili  1.0  /  DBili  x   /  AST  18  /  ALT  6<L>  /  AlkPhos  69  08-17    PTT - ( 16 Aug 2018 20:06 )  PTT:36.9 sec      RADIOLOGY & ADDITIONAL TESTS:  Care Discussed with Consultants/Other Providers: Floor NP    MEDICATIONS  (STANDING):  amiodarone    Tablet 200 milliGRAM(s) Oral daily  atorvastatin 20 milliGRAM(s) Oral at bedtime  cholecalciferol 1000 Unit(s) Oral daily  donepezil 5 milliGRAM(s) Oral at bedtime  latanoprost 0.005% Ophthalmic Solution 1 Drop(s) Both EYES at bedtime  metoprolol succinate ER 25 milliGRAM(s) Oral daily  pantoprazole    Tablet 40 milliGRAM(s) Oral two times a day  umeclidinium 62.5 MICROgram(s)/vilanterol 25 MICROgram(s) Inhaler 1 Puff(s) Inhalation daily    MEDICATIONS  (PRN):  acetaminophen   Tablet. 650 milliGRAM(s) Oral every 6 hours PRN Mild Pain (1 - 3)
Patient is a 88y old  Male who presents with a chief complaint of anemia (20 Aug 2018 10:44)        SUBJECTIVE / OVERNIGHT EVENTS:  overnight, no acute events. pt tolerated bowel prep.  denies any complaints, denies any blood loss via stool or urine. denies n/v/f/chills, abd pain.    CAPILLARY BLOOD GLUCOSE    I&O's Summary    19 Aug 2018 07:01  -  20 Aug 2018 07:00  --------------------------------------------------------  IN: 3800 mL / OUT: 1300 mL / NET: 2500 mL    Vital Signs Last 24 Hrs  T(C): 36.6 (20 Aug 2018 10:21), Max: 36.7 (20 Aug 2018 04:37)  T(F): 97.9 (20 Aug 2018 10:21), Max: 98 (20 Aug 2018 04:37)  HR: 61 (20 Aug 2018 10:21) (59 - 73)  BP: 139/59 (20 Aug 2018 10:21) (126/71 - 163/63)  BP(mean): --  RR: 16 (20 Aug 2018 10:21) (16 - 16)  SpO2: 95% (20 Aug 2018 10:21) (95% - 97%)    PHYSICAL EXAM:  GENERAL:  Well appearing, elderly M, [place], in NAD  HEAD:  NCAT  EYES: PERRLA, conjunctiva clear  NECK: Supple  CHEST/LUNG: CTA B/L. No w/r/r.  HEART: reg rate. Normal S1, S2. II/VI systollic murmur.  ABDOMEN: SNTND. Bowel sounds present  EXTREMITIES:  2+ Peripheral Pulses, No clubbing, cyanosis.  Trace LE edema  PSYCH: appropriate affect    LABS:                        8.2    4.42  )-----------( 214      ( 20 Aug 2018 06:38 )             25.4     08-20    140  |  107  |  19  ----------------------------<  94  4.3   |  19<L>  |  1.75<H>    Ca    8.9      20 Aug 2018 06:05      RADIOLOGY & ADDITIONAL TESTS:  Consultant(s) Notes Reviewed:  gi  Care Discussed with Consultants/Other Providers: Floor NP    MEDICATIONS  (STANDING):  amiodarone    Tablet 200 milliGRAM(s) Oral daily  atorvastatin 20 milliGRAM(s) Oral at bedtime  cholecalciferol 1000 Unit(s) Oral daily  donepezil 5 milliGRAM(s) Oral at bedtime  latanoprost 0.005% Ophthalmic Solution 1 Drop(s) Both EYES at bedtime  metoprolol succinate ER 25 milliGRAM(s) Oral daily  pantoprazole    Tablet 40 milliGRAM(s) Oral two times a day  umeclidinium 62.5 MICROgram(s)/vilanterol 25 MICROgram(s) Inhaler 1 Puff(s) Inhalation daily    MEDICATIONS  (PRN):  acetaminophen   Tablet. 650 milliGRAM(s) Oral every 6 hours PRN Mild Pain (1 - 3)

## 2018-08-20 NOTE — PROGRESS NOTE ADULT - PROBLEM SELECTOR PLAN 5
- holding ASA in setting of possible GI bleeding   - continue statin, metoprolol

## 2018-08-20 NOTE — PROGRESS NOTE ADULT - PROBLEM SELECTOR PLAN 8
no prior TTE on record  - holding lasix given JASON , can resume as needed  - check TTE
- holding lasix given JASON , can resume as needed  -check TTE (no baseline on file)
no prior TTE on record  - holding lasix given JASON , can resume as needed  - check TTE
no prior TTE on record  - holding lasix given JASON , can resume as needed  - check TTE

## 2018-08-20 NOTE — PROGRESS NOTE ADULT - PROVIDER SPECIALTY LIST ADULT
Gastroenterology
Hospitalist
[facility] Activation    Thank you for requesting access to [facility]. Please follow the instructions below to securely access and download your online medical record. [facility] allows you to send messages to your doctor, view your test results, renew your prescriptions, schedule appointments, and more. How Do I Sign Up? 1. In your internet browser, go to [email or web address]  2. Click on the First Time User? Click Here link in the Sign In box. You will be redirect to the New Member Sign Up page. 3. Enter your [facility] Access Code exactly as it appears below. You will not need to use this code after youve completed the sign-up process. If you do not sign up before the expiration date, you must request a new code. [facility] Access Code: [email or web address]  Expires: 11/15/2017  1:59 PM (This is the date your [facility] access code will )    4. Enter the last four digits of your Social Security Number (xxxx) and Date of Birth (mm/dd/yyyy) as indicated and click Submit. You will be taken to the next sign-up page. 5. Create a [facility] ID. This will be your [facility] login ID and cannot be changed, so think of one that is secure and easy to remember. 6. Create a [facility] password. You can change your password at any time. 7. Enter your Password Reset Question and Answer. This can be used at a later time if you forget your password. 8. Enter your e-mail address. You will receive e-mail notification when new information is available in [address]. 9. Click Sign Up. You can now view and download portions of your medical record. 10. Click the Download Summary menu link to download a portable copy of your medical information. Additional Information    If you have questions, please visit the Frequently Asked Questions section of the [employer] website at [email or web address] Remember, [facility] is NOT to be used for urgent needs. For medical emergencies, dial 911.
Hospitalist

## 2018-08-20 NOTE — PROGRESS NOTE ADULT - PROBLEM SELECTOR PLAN 7
bp controlled  - holding lisinopril can resume if BP and renal function improved
- holding lisinopril can resume if BP and renal function improved

## 2018-08-20 NOTE — PROGRESS NOTE ADULT - PROBLEM SELECTOR PLAN 6
- continue Anoro Ellipta   - N-Acetylcysteine not on formulary

## 2018-08-20 NOTE — DISCHARGE NOTE ADULT - MEDICATION SUMMARY - MEDICATIONS TO STOP TAKING
I will STOP taking the medications listed below when I get home from the hospital:    Xarelto 15 mg oral tablet  -- 1 tab(s) by mouth once a day (in the evening)    Lasix 40 mg oral tablet  -- 1 tab(s) by mouth once a day I will STOP taking the medications listed below when I get home from the hospital:    Xarelto 15 mg oral tablet  -- 1 tab(s) by mouth once a day (in the evening)    acetylcysteine 600 mg oral tablet  -- 1.5 tab(s) by mouth once a day    lisinopril 5 mg oral tablet  -- 1 tab(s) by mouth once a day    Lasix 40 mg oral tablet  -- 1 tab(s) by mouth once a day

## 2018-08-21 VITALS
OXYGEN SATURATION: 92 % | RESPIRATION RATE: 19 BRPM | TEMPERATURE: 97 F | SYSTOLIC BLOOD PRESSURE: 147 MMHG | DIASTOLIC BLOOD PRESSURE: 61 MMHG | HEART RATE: 60 BPM

## 2018-08-21 LAB
ANION GAP SERPL CALC-SCNC: 11 MMOL/L — SIGNIFICANT CHANGE UP (ref 5–17)
BUN SERPL-MCNC: 19 MG/DL — SIGNIFICANT CHANGE UP (ref 7–23)
CALCIUM SERPL-MCNC: 8.6 MG/DL — SIGNIFICANT CHANGE UP (ref 8.4–10.5)
CHLORIDE SERPL-SCNC: 104 MMOL/L — SIGNIFICANT CHANGE UP (ref 96–108)
CO2 SERPL-SCNC: 21 MMOL/L — LOW (ref 22–31)
CREAT SERPL-MCNC: 1.63 MG/DL — HIGH (ref 0.5–1.3)
GLUCOSE SERPL-MCNC: 96 MG/DL — SIGNIFICANT CHANGE UP (ref 70–99)
HCT VFR BLD CALC: 23.3 % — LOW (ref 39–50)
HCT VFR BLD CALC: 25.9 % — LOW (ref 39–50)
HGB BLD-MCNC: 7.3 G/DL — LOW (ref 13–17)
HGB BLD-MCNC: 8.7 G/DL — LOW (ref 13–17)
MCHC RBC-ENTMCNC: 30.2 PG — SIGNIFICANT CHANGE UP (ref 27–34)
MCHC RBC-ENTMCNC: 31.3 GM/DL — LOW (ref 32–36)
MCHC RBC-ENTMCNC: 32.3 PG — SIGNIFICANT CHANGE UP (ref 27–34)
MCHC RBC-ENTMCNC: 33.6 GM/DL — SIGNIFICANT CHANGE UP (ref 32–36)
MCV RBC AUTO: 96 FL — SIGNIFICANT CHANGE UP (ref 80–100)
MCV RBC AUTO: 96.3 FL — SIGNIFICANT CHANGE UP (ref 80–100)
PLATELET # BLD AUTO: 216 K/UL — SIGNIFICANT CHANGE UP (ref 150–400)
PLATELET # BLD AUTO: 219 K/UL — SIGNIFICANT CHANGE UP (ref 150–400)
POTASSIUM SERPL-MCNC: 4.3 MMOL/L — SIGNIFICANT CHANGE UP (ref 3.5–5.3)
POTASSIUM SERPL-SCNC: 4.3 MMOL/L — SIGNIFICANT CHANGE UP (ref 3.5–5.3)
RBC # BLD: 2.42 M/UL — LOW (ref 4.2–5.8)
RBC # BLD: 2.7 M/UL — LOW (ref 4.2–5.8)
RBC # FLD: 14.2 % — SIGNIFICANT CHANGE UP (ref 10.3–14.5)
RBC # FLD: 16.4 % — HIGH (ref 10.3–14.5)
SODIUM SERPL-SCNC: 136 MMOL/L — SIGNIFICANT CHANGE UP (ref 135–145)
WBC # BLD: 4.62 K/UL — SIGNIFICANT CHANGE UP (ref 3.8–10.5)
WBC # BLD: 5.1 K/UL — SIGNIFICANT CHANGE UP (ref 3.8–10.5)
WBC # FLD AUTO: 4.62 K/UL — SIGNIFICANT CHANGE UP (ref 3.8–10.5)
WBC # FLD AUTO: 5.1 K/UL — SIGNIFICANT CHANGE UP (ref 3.8–10.5)

## 2018-08-21 PROCEDURE — 82803 BLOOD GASES ANY COMBINATION: CPT

## 2018-08-21 PROCEDURE — 82746 ASSAY OF FOLIC ACID SERUM: CPT

## 2018-08-21 PROCEDURE — 83735 ASSAY OF MAGNESIUM: CPT

## 2018-08-21 PROCEDURE — 83605 ASSAY OF LACTIC ACID: CPT

## 2018-08-21 PROCEDURE — 84295 ASSAY OF SERUM SODIUM: CPT

## 2018-08-21 PROCEDURE — 93005 ELECTROCARDIOGRAM TRACING: CPT

## 2018-08-21 PROCEDURE — 84132 ASSAY OF SERUM POTASSIUM: CPT

## 2018-08-21 PROCEDURE — 82728 ASSAY OF FERRITIN: CPT

## 2018-08-21 PROCEDURE — 80048 BASIC METABOLIC PNL TOTAL CA: CPT

## 2018-08-21 PROCEDURE — 85027 COMPLETE CBC AUTOMATED: CPT

## 2018-08-21 PROCEDURE — 82565 ASSAY OF CREATININE: CPT

## 2018-08-21 PROCEDURE — 76770 US EXAM ABDO BACK WALL COMP: CPT

## 2018-08-21 PROCEDURE — 85014 HEMATOCRIT: CPT

## 2018-08-21 PROCEDURE — 84100 ASSAY OF PHOSPHORUS: CPT

## 2018-08-21 PROCEDURE — 85730 THROMBOPLASTIN TIME PARTIAL: CPT

## 2018-08-21 PROCEDURE — 86850 RBC ANTIBODY SCREEN: CPT

## 2018-08-21 PROCEDURE — 82607 VITAMIN B-12: CPT

## 2018-08-21 PROCEDURE — 82330 ASSAY OF CALCIUM: CPT

## 2018-08-21 PROCEDURE — 99239 HOSP IP/OBS DSCHRG MGMT >30: CPT

## 2018-08-21 PROCEDURE — 86364 TISS TRNSGLTMNASE EA IG CLAS: CPT

## 2018-08-21 PROCEDURE — 80053 COMPREHEN METABOLIC PANEL: CPT

## 2018-08-21 PROCEDURE — 86901 BLOOD TYPING SEROLOGIC RH(D): CPT

## 2018-08-21 PROCEDURE — 71045 X-RAY EXAM CHEST 1 VIEW: CPT

## 2018-08-21 PROCEDURE — 84300 ASSAY OF URINE SODIUM: CPT

## 2018-08-21 PROCEDURE — 94640 AIRWAY INHALATION TREATMENT: CPT

## 2018-08-21 PROCEDURE — 99285 EMERGENCY DEPT VISIT HI MDM: CPT

## 2018-08-21 PROCEDURE — 84484 ASSAY OF TROPONIN QUANT: CPT

## 2018-08-21 PROCEDURE — 85045 AUTOMATED RETICULOCYTE COUNT: CPT

## 2018-08-21 PROCEDURE — 82435 ASSAY OF BLOOD CHLORIDE: CPT

## 2018-08-21 PROCEDURE — 86900 BLOOD TYPING SEROLOGIC ABO: CPT

## 2018-08-21 PROCEDURE — 83550 IRON BINDING TEST: CPT

## 2018-08-21 PROCEDURE — 97161 PT EVAL LOW COMPLEX 20 MIN: CPT

## 2018-08-21 PROCEDURE — 82947 ASSAY GLUCOSE BLOOD QUANT: CPT

## 2018-08-21 PROCEDURE — 76770 US EXAM ABDO BACK WALL COMP: CPT | Mod: 26

## 2018-08-21 RX ORDER — METOPROLOL TARTRATE 50 MG
1 TABLET ORAL
Qty: 30 | Refills: 0 | OUTPATIENT
Start: 2018-08-21 | End: 2018-09-19

## 2018-08-21 RX ORDER — FUROSEMIDE 40 MG
1 TABLET ORAL
Qty: 0 | Refills: 0 | COMMUNITY

## 2018-08-21 RX ORDER — PANTOPRAZOLE SODIUM 20 MG/1
40 TABLET, DELAYED RELEASE ORAL
Qty: 0 | Refills: 0 | Status: DISCONTINUED | OUTPATIENT
Start: 2018-08-21 | End: 2018-08-21

## 2018-08-21 RX ORDER — FONDAPARINUX SODIUM 2.5 MG/.5ML
1 INJECTION, SOLUTION SUBCUTANEOUS
Qty: 0 | Refills: 0 | COMMUNITY

## 2018-08-21 RX ORDER — ACETYLCYSTEINE 200 MG/ML
1.5 VIAL (ML) MISCELLANEOUS
Qty: 0 | Refills: 0 | COMMUNITY

## 2018-08-21 RX ORDER — FERROUS SULFATE 325(65) MG
1 TABLET ORAL
Qty: 30 | Refills: 0 | OUTPATIENT
Start: 2018-08-21 | End: 2018-09-19

## 2018-08-21 RX ORDER — METOPROLOL TARTRATE 50 MG
1 TABLET ORAL
Qty: 0 | Refills: 0 | COMMUNITY

## 2018-08-21 RX ORDER — AMIODARONE HYDROCHLORIDE 400 MG/1
1 TABLET ORAL
Qty: 0 | Refills: 0 | COMMUNITY

## 2018-08-21 RX ORDER — ATORVASTATIN CALCIUM 80 MG/1
1 TABLET, FILM COATED ORAL
Qty: 0 | Refills: 0 | COMMUNITY
Start: 2018-08-21

## 2018-08-21 RX ORDER — AMIODARONE HYDROCHLORIDE 400 MG/1
1 TABLET ORAL
Qty: 0 | Refills: 0 | COMMUNITY
Start: 2018-08-21

## 2018-08-21 RX ORDER — PANTOPRAZOLE SODIUM 20 MG/1
1 TABLET, DELAYED RELEASE ORAL
Qty: 30 | Refills: 0 | OUTPATIENT
Start: 2018-08-21 | End: 2018-09-19

## 2018-08-21 RX ORDER — DONEPEZIL HYDROCHLORIDE 10 MG/1
1 TABLET, FILM COATED ORAL
Qty: 0 | Refills: 0 | COMMUNITY
Start: 2018-08-21

## 2018-08-21 RX ADMIN — UMECLIDINIUM BROMIDE AND VILANTEROL TRIFENATATE 1 PUFF(S): 62.5; 25 POWDER RESPIRATORY (INHALATION) at 14:13

## 2018-08-21 RX ADMIN — AMIODARONE HYDROCHLORIDE 200 MILLIGRAM(S): 400 TABLET ORAL at 05:14

## 2018-08-21 RX ADMIN — Medication 1000 UNIT(S): at 14:12

## 2018-08-21 RX ADMIN — PANTOPRAZOLE SODIUM 40 MILLIGRAM(S): 20 TABLET, DELAYED RELEASE ORAL at 05:14

## 2018-08-21 RX ADMIN — Medication 325 MILLIGRAM(S): at 14:11

## 2018-08-21 RX ADMIN — Medication 25 MILLIGRAM(S): at 05:14

## 2018-08-21 NOTE — CHART NOTE - NSCHARTNOTEFT_GEN_A_CORE
pt seen at bedside denies complaints, no melena/brbrpr or bleeding.  labs reviewed - am lab cbc suspect error, repeat cbc shows hgb within pt's baseline here hgb ~8. Cr shows improvement now 1.6  img shows renal u/s medical renal disease.   as no active signs of bleed, egd/cscope reviewed, and hgb stable, can d/c today to c/w iron tabs. cont to hold xarelto, lasix and follow up cardiologist.  called and spoke w/ HCP Karen Jones, all ques answered, to  pt today at 4pm.  discharge time 50 min. see d/c summary.

## 2018-08-22 PROBLEM — J84.10 PULMONARY FIBROSIS, UNSPECIFIED: Chronic | Status: ACTIVE | Noted: 2018-08-16

## 2018-08-22 PROBLEM — I48.91 UNSPECIFIED ATRIAL FIBRILLATION: Chronic | Status: ACTIVE | Noted: 2018-08-16

## 2018-08-24 LAB
HLA FOR CELIAC DISEASE PNL BLD/T: SIGNIFICANT CHANGE UP

## 2018-08-28 ENCOUNTER — APPOINTMENT (OUTPATIENT)
Dept: INTERNAL MEDICINE | Facility: CLINIC | Age: 83
End: 2018-08-28
Payer: MEDICARE

## 2018-08-28 VITALS
TEMPERATURE: 97.9 F | HEART RATE: 75 BPM | HEIGHT: 68 IN | WEIGHT: 195 LBS | OXYGEN SATURATION: 90 % | DIASTOLIC BLOOD PRESSURE: 44 MMHG | SYSTOLIC BLOOD PRESSURE: 96 MMHG | BODY MASS INDEX: 29.55 KG/M2

## 2018-08-28 DIAGNOSIS — K57.32 DIVERTICULITIS OF LARGE INTESTINE W/OUT PERFORATION OR ABSCESS W/OUT BLEEDING: ICD-10-CM

## 2018-08-28 PROCEDURE — 99214 OFFICE O/P EST MOD 30 MIN: CPT

## 2018-08-28 RX ORDER — LISINOPRIL 5 MG/1
5 TABLET ORAL DAILY
Qty: 90 | Refills: 1 | Status: COMPLETED | COMMUNITY
End: 2018-08-23

## 2018-08-28 RX ORDER — RIVAROXABAN 10 MG/1
10 TABLET, FILM COATED ORAL
Refills: 0 | Status: ACTIVE | COMMUNITY

## 2018-08-28 RX ORDER — RIVAROXABAN 15 MG/1
15 TABLET, FILM COATED ORAL
Refills: 0 | Status: DISCONTINUED | COMMUNITY
End: 2018-08-28

## 2018-08-28 RX ORDER — AMIODARONE HYDROCHLORIDE 200 MG/1
200 TABLET ORAL
Refills: 0 | Status: ACTIVE | COMMUNITY

## 2018-08-28 RX ORDER — DOCUSATE SODIUM 100 MG/1
CAPSULE ORAL
Refills: 0 | Status: ACTIVE | COMMUNITY

## 2018-08-29 LAB
ANION GAP SERPL CALC-SCNC: 13 MMOL/L
BASOPHILS # BLD AUTO: 0.02 K/UL
BASOPHILS NFR BLD AUTO: 0.3 %
BUN SERPL-MCNC: 19 MG/DL
CALCIUM SERPL-MCNC: 9.6 MG/DL
CHLORIDE SERPL-SCNC: 102 MMOL/L
CO2 SERPL-SCNC: 23 MMOL/L
CREAT SERPL-MCNC: 1.43 MG/DL
EOSINOPHIL # BLD AUTO: 0.1 K/UL
EOSINOPHIL NFR BLD AUTO: 1.7 %
FERRITIN SERPL-MCNC: 33 NG/ML
GLUCOSE SERPL-MCNC: 91 MG/DL
HCT VFR BLD CALC: 30.9 %
HGB BLD-MCNC: 9 G/DL
IMM GRANULOCYTES NFR BLD AUTO: 0.2 %
IRON SATN MFR SERPL: 8 %
IRON SERPL-MCNC: 30 UG/DL
LYMPHOCYTES # BLD AUTO: 0.95 K/UL
LYMPHOCYTES NFR BLD AUTO: 16.4 %
MAN DIFF?: NORMAL
MCHC RBC-ENTMCNC: 29.1 GM/DL
MCHC RBC-ENTMCNC: 30.5 PG
MCV RBC AUTO: 104.7 FL
MONOCYTES # BLD AUTO: 0.74 K/UL
MONOCYTES NFR BLD AUTO: 12.8 %
NEUTROPHILS # BLD AUTO: 3.96 K/UL
NEUTROPHILS NFR BLD AUTO: 68.6 %
PLATELET # BLD AUTO: 276 K/UL
POTASSIUM SERPL-SCNC: 5 MMOL/L
RBC # BLD: 2.95 M/UL
RBC # FLD: 17.3 %
SODIUM SERPL-SCNC: 138 MMOL/L
TIBC SERPL-MCNC: 386 UG/DL
UIBC SERPL-MCNC: 356 UG/DL
WBC # FLD AUTO: 5.78 K/UL

## 2018-09-13 ENCOUNTER — TRANSCRIPTION ENCOUNTER (OUTPATIENT)
Age: 83
End: 2018-09-13

## 2018-09-17 ENCOUNTER — RX RENEWAL (OUTPATIENT)
Age: 83
End: 2018-09-17

## 2018-09-17 ENCOUNTER — TRANSCRIPTION ENCOUNTER (OUTPATIENT)
Age: 83
End: 2018-09-17

## 2018-09-22 ENCOUNTER — LABORATORY RESULT (OUTPATIENT)
Age: 83
End: 2018-09-22

## 2018-09-24 LAB
ANION GAP SERPL CALC-SCNC: 12 MMOL/L
BUN SERPL-MCNC: 17 MG/DL
CALCIUM SERPL-MCNC: 9.1 MG/DL
CHLORIDE SERPL-SCNC: 101 MMOL/L
CO2 SERPL-SCNC: 25 MMOL/L
CREAT SERPL-MCNC: 1.46 MG/DL
GLUCOSE SERPL-MCNC: 93 MG/DL
POTASSIUM SERPL-SCNC: 5.1 MMOL/L
SODIUM SERPL-SCNC: 138 MMOL/L

## 2018-09-24 NOTE — HISTORY OF PRESENT ILLNESS
[FreeTextEntry2] : 89 yo male\par Always SOB but Hb 8.1% NEW  More fatigue from anemia  down form 11-12  No transfusion\par \par colonoscopy \par diverticulosis  \par \par EGD\par  A medium-sized hiatus hernia was present.      The exam of the esophagus was otherwise normal.      The cardia, gastric fundus, gastric body, incisura and gastric antrum were normal.      The duodenal bulb, first part of the duodenum and 2nd part of the duodenum were normal.                                                                                                      Impression:          - Medium-sized hiatus hernia.                      - Normal cardia, gastric fundus, gastric body, incisura and antrum.                      - Normal duodenal bulb, first part of the duodenum and 2nd part of the                       duodenum.                      - No blood or source of bleeding was identified on this exam. Recommendation:      - Perform a colonoscopy today.\par \par Best explanation  GUIAC ++ but no GI source found and anticoag\par GFR was 61  Feb 2018 anticoagulated AFIB in January 2018 ---> GFR52 March 2018---> GFR39 April --->GFR36 August\par GFR on D/C =37\par \par US KIDNEY\par FINDINGS:  Right kidney:  10.4 cm. No renal mass, hydronephrosis or calculi. There  is mild right cortical thinning.  Left kidney:  9.5 cm. No renal mass, hydronephrosis or calculi. There is  mild left cortical thinning.  Urinary bladder: Minimally distended. Very limited. Cannot evaluate.  Right pleural effusion.  IMPRESSION:   No hydronephrosis. 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   \par \par \par \par

## 2018-09-24 NOTE — PHYSICAL EXAM
[No Respiratory Distress] : no respiratory distress  [Clear to Auscultation] : lungs were clear to auscultation bilaterally [No Acute Distress] : no acute distress [Well Nourished] : well nourished [Well Developed] : well developed [Well-Appearing] : well-appearing [No JVD] : no jugular venous distention [Supple] : supple [Normal Rate] : normal rate  [Regular Rhythm] : with a regular rhythm [Normal S1, S2] : normal S1 and S2 [No Edema] : there was no peripheral edema [Soft] : abdomen soft [No Joint Swelling] : no joint swelling [No Rash] : no rash [Speech Grossly Normal] : speech grossly normal [de-identified] : III/VI systolic murmur

## 2018-09-24 NOTE — ASSESSMENT
[FreeTextEntry1] : 89 yo CAD CABG x 5 1995, bronchiectasis, pulm fibrosis, dementia, hip pain, retired  former [employer] pt, moderate AS, mild MR, CHF, RCA stent x 2 1998\par ECG in hosp SB with 1st DEGREE AVB  LVH Criteria\par NSR today\par \par AFIB: amiodarone\par Dr Keiko Alberto  ON XARELTO\par \par Ordered Home Care Follow up\par Repeat CBC next month and today\par Heme ++ xarelto for AFIB continues desp slow GI Bleed  Hemodynamically stable

## 2018-09-28 ENCOUNTER — APPOINTMENT (OUTPATIENT)
Dept: NEPHROLOGY | Facility: CLINIC | Age: 83
End: 2018-09-28
Payer: MEDICARE

## 2018-09-28 DIAGNOSIS — D64.9 ANEMIA, UNSPECIFIED: ICD-10-CM

## 2018-09-28 PROCEDURE — 99205 OFFICE O/P NEW HI 60 MIN: CPT

## 2018-09-30 VITALS — HEART RATE: 70 BPM | DIASTOLIC BLOOD PRESSURE: 60 MMHG | SYSTOLIC BLOOD PRESSURE: 126 MMHG | HEIGHT: 68 IN

## 2018-09-30 PROBLEM — D64.9 ANEMIA: Status: ACTIVE | Noted: 2018-08-16

## 2018-11-19 ENCOUNTER — APPOINTMENT (OUTPATIENT)
Dept: INTERNAL MEDICINE | Facility: CLINIC | Age: 83
End: 2018-11-19
Payer: MEDICARE

## 2018-11-19 VITALS
HEART RATE: 57 BPM | TEMPERATURE: 98 F | SYSTOLIC BLOOD PRESSURE: 119 MMHG | HEIGHT: 68 IN | BODY MASS INDEX: 29.1 KG/M2 | OXYGEN SATURATION: 95 % | DIASTOLIC BLOOD PRESSURE: 60 MMHG | WEIGHT: 192 LBS

## 2018-11-19 VITALS — SYSTOLIC BLOOD PRESSURE: 144 MMHG | DIASTOLIC BLOOD PRESSURE: 68 MMHG

## 2018-11-19 DIAGNOSIS — D50.9 IRON DEFICIENCY ANEMIA, UNSPECIFIED: ICD-10-CM

## 2018-11-19 PROCEDURE — 99214 OFFICE O/P EST MOD 30 MIN: CPT

## 2018-11-19 RX ORDER — ASPIRIN 81 MG
81 TABLET, DELAYED RELEASE (ENTERIC COATED) ORAL
Refills: 0 | Status: ACTIVE | COMMUNITY

## 2018-11-19 NOTE — HISTORY OF PRESENT ILLNESS
[FreeTextEntry1] : HTN [de-identified] : 87 yo  COPD, bronchiectasis, had normal renal fn until after poor rate control of AFIB then decre GFR\par \par Had AFIB rapid response 140  Dec 2017--->Dr Aden Jan 2018 multaq, metoprolol didn't help went to hospital for still going 120. Then his GFR decreased due to AFIB. In the hospital CARDIOVERSION did not last, amiodarone ADDED kept out of AFIB, Xarelto  15mg daily--->  stopped due to anemia HEME NEG no GI [id number] daily August 2018 due to\par \par 2) pulm fibrosis BEFORE amiodarone  seeing PULM 23Nov\par 3) CAD CABG x 5 1995  RCA stent x 2 1997\par 4) MR and mod AS\par \par Retire  refuses HOME VISITS  daughter is a Nurse  Daughter does his meds, son manages his finances

## 2018-11-19 NOTE — REVIEW OF SYSTEMS
[Fever] : no fever [Discharge] : no discharge [Chest Pain] : no chest pain [Palpitations] : no palpitations [Shortness Of Breath] : no shortness of breath [Wheezing] : no wheezing [Cough] : no cough

## 2018-11-22 ENCOUNTER — FORM ENCOUNTER (OUTPATIENT)
Age: 83
End: 2018-11-22

## 2018-11-23 ENCOUNTER — APPOINTMENT (OUTPATIENT)
Dept: PULMONOLOGY | Facility: CLINIC | Age: 83
End: 2018-11-23
Payer: MEDICARE

## 2018-11-23 ENCOUNTER — OUTPATIENT (OUTPATIENT)
Dept: OUTPATIENT SERVICES | Facility: HOSPITAL | Age: 83
LOS: 1 days | End: 2018-11-23
Payer: MEDICARE

## 2018-11-23 ENCOUNTER — NON-APPOINTMENT (OUTPATIENT)
Age: 83
End: 2018-11-23

## 2018-11-23 ENCOUNTER — APPOINTMENT (OUTPATIENT)
Dept: CT IMAGING | Facility: CLINIC | Age: 83
End: 2018-11-23
Payer: MEDICARE

## 2018-11-23 VITALS
WEIGHT: 190 LBS | HEIGHT: 68 IN | DIASTOLIC BLOOD PRESSURE: 70 MMHG | OXYGEN SATURATION: 98 % | HEART RATE: 76 BPM | SYSTOLIC BLOOD PRESSURE: 144 MMHG | BODY MASS INDEX: 28.79 KG/M2

## 2018-11-23 DIAGNOSIS — R93.89 ABNORMAL FINDINGS ON DIAGNOSTIC IMAGING OF OTHER SPECIFIED BODY STRUCTURES: ICD-10-CM

## 2018-11-23 DIAGNOSIS — J84.9 INTERSTITIAL PULMONARY DISEASE, UNSPECIFIED: ICD-10-CM

## 2018-11-23 PROCEDURE — 99214 OFFICE O/P EST MOD 30 MIN: CPT | Mod: 25

## 2018-11-23 PROCEDURE — 71250 CT THORAX DX C-: CPT | Mod: 26

## 2018-11-23 PROCEDURE — 94010 BREATHING CAPACITY TEST: CPT | Mod: 59

## 2018-11-23 PROCEDURE — 94618 PULMONARY STRESS TESTING: CPT

## 2018-11-23 PROCEDURE — 71250 CT THORAX DX C-: CPT

## 2018-11-23 RX ORDER — ALBUTEROL SULFATE 90 UG/1
108 (90 BASE) AEROSOL, METERED RESPIRATORY (INHALATION) EVERY 6 HOURS
Qty: 3 | Refills: 0 | Status: ACTIVE | COMMUNITY
Start: 2018-11-23 | End: 1900-01-01

## 2018-11-23 NOTE — PROCEDURE
[FreeTextEntry1] : PFT revealed normal flows, with a FEV1 of  1.92 ,which is 84% of predicted, with a normal flow volume loop\par  \par CXR (8.16.18) revealed  peripheral reticular opacities at bases c/w known fibrosis \par \par 6 minute walk test reveals a low saturation of 90%; walked 0.03 miles or 63.41 meters. Patient was tired during his second minute, and his legs were hurting him. He asked if he could stop.

## 2018-11-23 NOTE — REVIEW OF SYSTEMS
[Fatigue] : fatigue [Cough] : cough [Dyspnea] : dyspnea [Negative] : Sleep Disorder [Edema] : ~T edema was present [As Noted in HPI] : as noted in HPI [Heartburn] : heartburn [Reflux] : reflux [FreeTextEntry6] : +hip pain

## 2018-11-23 NOTE — REASON FOR VISIT
[Acute] : an acute visit [Follow-Up] : a follow-up visit [Family Member] : family member [FreeTextEntry1] :  bronchiectasis, COPD, dyspnea, GERD, and interstitial lung disease.

## 2018-11-23 NOTE — ADDENDUM
[FreeTextEntry1] : Documented by Hero Kelly acting as a scribe for Dr. Louis Grigsby on 11/23/18.\par \par All medical record entries made by the Scribe were at my, Dr. Louis Grigsby's, direction and personally dictated by me on 11/23/18. I have reviewed the chart and agree that the record accurately reflects my personal performance of the history, physical exam, assessment and plan. I have also personally directed, reviewed, and agree with the discharge instructions. \par \par \par

## 2018-11-23 NOTE — PHYSICAL EXAM
[General Appearance - Well Developed] : well developed [Normal Appearance] : normal appearance [Well Groomed] : well groomed [General Appearance - Well Nourished] : well nourished [No Deformities] : no deformities [General Appearance - In No Acute Distress] : no acute distress [Normal Conjunctiva] : the conjunctiva exhibited no abnormalities [Eyelids - No Xanthelasma] : the eyelids demonstrated no xanthelasmas [Normal Oropharynx] : normal oropharynx [Neck Appearance] : the appearance of the neck was normal [Neck Cervical Mass (___cm)] : no neck mass was observed [Jugular Venous Distention Increased] : there was no jugular-venous distention [Thyroid Diffuse Enlargement] : the thyroid was not enlarged [Thyroid Nodule] : there were no palpable thyroid nodules [Heart Rate And Rhythm] : heart rate and rhythm were normal [Heart Sounds] : normal S1 and S2 [Murmurs] : no murmurs present [Exaggerated Use Of Accessory Muscles For Inspiration] : no accessory muscle use [Abdomen Soft] : soft [Abdomen Tenderness] : non-tender [Abdomen Mass (___ Cm)] : no abdominal mass palpated [Abnormal Walk] : normal gait [Gait - Sufficient For Exercise Testing] : the gait was sufficient for exercise testing [Skin Color & Pigmentation] : normal skin color and pigmentation [No Venous Stasis] : no venous stasis [Skin Lesions] : no skin lesions [No Skin Ulcers] : no skin ulcer [No Xanthoma] : no  xanthoma was observed [Deep Tendon Reflexes (DTR)] : deep tendon reflexes were 2+ and symmetric [Sensation] : the sensory exam was normal to light touch and pinprick [No Focal Deficits] : no focal deficits [Oriented To Time, Place, And Person] : oriented to person, place, and time [Impaired Insight] : insight and judgment were intact [Affect] : the affect was normal [Normal Oral Mucosa] : normal oral mucosa [No Oral Pallor] : no oral pallor [No Oral Cyanosis] : no oral cyanosis [Nail Clubbing] : no clubbing of the fingernails [Cyanosis, Localized] : no localized cyanosis [Petechial Hemorrhages (___cm)] : no petechial hemorrhages [] : no ischemic changes [III] : III [1+ Pitting] : 1+  pitting [FreeTextEntry1] : I:E ratio 1:3; mild inspiratory crackles at bases b/l

## 2018-11-23 NOTE — ASSESSMENT
[FreeTextEntry1] : Mr. Post's shortness of breath is multifactorial due to pulmonary fibrosis, COPD, bronchiectasis, and poor mechanics as well as underlying cardiac disease/Afib. He stable from a pulmonary perspective. \par \par His shortness of breath is multifactorial due to:\par - lung diseases \par -poor breathing mechanics \par -overweight/out of shape\par -CAD \par \par problem 1: COPD\par -add Anoro 1 inhalation QD\par -add Ventolin 2 inhalations PRN up to Q6H \par \par -Inhaler technique reviewed as well as oral hygiene techniques reviewed with patient. Avoidance of cold air, extremes of temperature, rescue inhaler should be used before exercise. Order of medication reviewed with patient. Recommended use of a cool mist humidifier in the bedroom. \par \par Asthma is believed to be caused by inherited (genetic) and environmental factor, but its exact cause is unknown. Asthma may be triggered by allergens, lung infections, or irritants in the air. Asthma triggers are different for each person \par \par  problem 2: UIP/ Pulmonary fibrosis\par -follow up CT both prone and supine\par \par problem 3: Amiodarone therapy \par -To have full PFTs\par Amiodarone/bleomycin/methotrexate and other drugs have been associated with pulmonary parenchymal damage. These drugs require pulmonary function testing including DLCO regularly and possible CT/CXR if respiratory complaints develop. PFTs should be performed at 6 month intervals. \par \par problem 4: GERD\par -continue Pepcid 40mg QHS\par \par -Rule of 2s: avoid eating too much, eating too fast, eating too late, eating too spicy, eating two hours before bed\par -Things to avoid including overeating, spicy foods, tight clothing, eating within three hours of bed, this list is not all inclusive. \par -For treatment of reflux, possible options discussed including diet control, H2 blockers, PPIs, as well as coating motility agents discussed as treatment options. Timing of meals and proximity of last meal to sleep were discussed. If symptoms persist, a formal gastrointestinal evaluation is needed.\par \par problem 5: overweight/out of shape\par Weight loss, exercise, and diet control were discussed and are highly encouraged. Treatment options were given such as, aqua therapy, and contacting a nutritionist. Recommended to use the elliptical, stationary bike, less use of treadmill. Mindful eating was explained to the patient Obesity is associated with worsening asthma, shortness of breath, and potential for cardiac disease, diabetes, and other underlying medical conditions. \par \par problem 6: poor breathing mechanics\par -Proper breathing techniques were reviewed with an emphasis of exhalation. Patient instructed to breath in for 1 second and out for four seconds. Patient was encouraged to not talk while walking. \par \par Problem 7: Low Vitamin D\par -recommended to take Vitamin D, 50,000 units every 2 weeks\par \par Low Vitamin D has been associated with asthma exacerbations and increased allergic symptoms. The goal based on recent information is maintaining levels between 50-70 and low normal is 30. Recommended 50,000 units every two weeks to once a month depending on the level. \par \par  \par  F/U in 4 months\par Patient is encouraged to call with any changes, concerns, or questions.

## 2018-11-26 LAB
ANION GAP SERPL CALC-SCNC: 13 MMOL/L
BASOPHILS # BLD AUTO: 0.01 K/UL
BASOPHILS NFR BLD AUTO: 0.2 %
BUN SERPL-MCNC: 23 MG/DL
CALCIUM SERPL-MCNC: 9.6 MG/DL
CHLORIDE SERPL-SCNC: 100 MMOL/L
CO2 SERPL-SCNC: 23 MMOL/L
CREAT SERPL-MCNC: 1.53 MG/DL
EOSINOPHIL # BLD AUTO: 0.03 K/UL
EOSINOPHIL NFR BLD AUTO: 0.6 %
FERRITIN SERPL-MCNC: 49 NG/ML
GLUCOSE SERPL-MCNC: 102 MG/DL
HCT VFR BLD CALC: 36.7 %
HGB BLD-MCNC: 12 G/DL
IMM GRANULOCYTES NFR BLD AUTO: 0 %
IRON SATN MFR SERPL: 46 %
IRON SERPL-MCNC: 152 UG/DL
LYMPHOCYTES # BLD AUTO: 1.07 K/UL
LYMPHOCYTES NFR BLD AUTO: 22 %
MAN DIFF?: NORMAL
MCHC RBC-ENTMCNC: 32.3 PG
MCHC RBC-ENTMCNC: 32.7 GM/DL
MCV RBC AUTO: 98.7 FL
MONOCYTES # BLD AUTO: 0.38 K/UL
MONOCYTES NFR BLD AUTO: 7.8 %
NEUTROPHILS # BLD AUTO: 3.37 K/UL
NEUTROPHILS NFR BLD AUTO: 69.4 %
PLATELET # BLD AUTO: 208 K/UL
POTASSIUM SERPL-SCNC: 5 MMOL/L
RBC # BLD: 3.72 M/UL
RBC # FLD: 16.2 %
SODIUM SERPL-SCNC: 136 MMOL/L
TIBC SERPL-MCNC: 331 UG/DL
UIBC SERPL-MCNC: 179 UG/DL
WBC # FLD AUTO: 4.86 K/UL

## 2018-12-15 ENCOUNTER — RX RENEWAL (OUTPATIENT)
Age: 83
End: 2018-12-15

## 2018-12-15 ENCOUNTER — TRANSCRIPTION ENCOUNTER (OUTPATIENT)
Age: 83
End: 2018-12-15

## 2019-01-15 NOTE — ED PROVIDER NOTE - DR. NAME
Wound Evaluated By: Dr ding
Add 74775 Cpt? (Important Note: In 2017 The Use Of 77723 Is Being Tracked By Cms To Determine Future Global Period Reimbursement For Global Periods): yes
Detail Level: Detailed
Dr. Eric Stafford

## 2019-03-26 ENCOUNTER — APPOINTMENT (OUTPATIENT)
Dept: PULMONOLOGY | Facility: CLINIC | Age: 84
End: 2019-03-26
Payer: MEDICARE

## 2019-03-26 ENCOUNTER — NON-APPOINTMENT (OUTPATIENT)
Age: 84
End: 2019-03-26

## 2019-03-26 VITALS
HEIGHT: 68 IN | DIASTOLIC BLOOD PRESSURE: 64 MMHG | SYSTOLIC BLOOD PRESSURE: 120 MMHG | OXYGEN SATURATION: 94 % | WEIGHT: 186 LBS | BODY MASS INDEX: 28.19 KG/M2 | HEART RATE: 60 BPM | RESPIRATION RATE: 16 BRPM

## 2019-03-26 PROCEDURE — 99214 OFFICE O/P EST MOD 30 MIN: CPT | Mod: 25

## 2019-03-26 PROCEDURE — 94010 BREATHING CAPACITY TEST: CPT

## 2019-03-26 NOTE — ASSESSMENT
[FreeTextEntry1] : Mr. Post's shortness of breath is multifactorial due to pulmonary fibrosis, COPD, bronchiectasis, and poor mechanics as well as underlying cardiac disease/Afib. He stable from a pulmonary perspective. His most recent CT (11/23/2018) was unchanged from 2016. \par \par His shortness of breath is multifactorial due to:\par - lung diseases \par -poor breathing mechanics \par -overweight/out of shape\par -CAD \par \par problem 1: COPD\par - Continue  Anoro 1 inhalation QD\par - Continue Ventolin 2 inhalations PRN up to Q6H \par \par -Inhaler technique reviewed as well as oral hygiene techniques reviewed with patient. Avoidance of cold air, extremes of temperature, rescue inhaler should be used before exercise. Order of medication reviewed with patient. Recommended use of a cool mist humidifier in the bedroom. \par \par Asthma is believed to be caused by inherited (genetic) and environmental factor, but its exact cause is unknown. Asthma may be triggered by allergens, lung infections, or irritants in the air. Asthma triggers are different for each person \par \par  problem 2: UIP/ Pulmonary fibrosis\par -follow up CT both prone and supine stable 11/2018\par - Next CT 11/2019\par \par problem 3: Amiodarone therapy \par -To have full PFTs\par Amiodarone/bleomycin/methotrexate and other drugs have been associated with pulmonary parenchymal damage. These drugs require pulmonary function testing including DLCO regularly and possible CT/CXR if respiratory complaints develop. PFTs should be performed at 6 month intervals. \par \par problem 4: GERD\par -continue Pepcid 40mg QHS\par \par -Rule of 2s: avoid eating too much, eating too fast, eating too late, eating too spicy, eating two hours before bed\par -Things to avoid including overeating, spicy foods, tight clothing, eating within three hours of bed, this list is not all inclusive. \par -For treatment of reflux, possible options discussed including diet control, H2 blockers, PPIs, as well as coating motility agents discussed as treatment options. Timing of meals and proximity of last meal to sleep were discussed. If symptoms persist, a formal gastrointestinal evaluation is needed.\par \par problem 5: overweight/out of shape\par Weight loss, exercise, and diet control were discussed and are highly encouraged. Treatment options were given such as, aqua therapy, and contacting a nutritionist. Recommended to use the elliptical, stationary bike, less use of treadmill. Mindful eating was explained to the patient Obesity is associated with worsening asthma, shortness of breath, and potential for cardiac disease, diabetes, and other underlying medical conditions. \par \par problem 6: poor breathing mechanics\par -Proper breathing techniques were reviewed with an emphasis of exhalation. Patient instructed to breath in for 1 second and out for four seconds. Patient was encouraged to not talk while walking. \par \par Problem 7: Low Vitamin D\par -recommended to take Vitamin D, 50,000 units every 2 weeks\par \par Low Vitamin D has been associated with asthma exacerbations and increased allergic symptoms. The goal based on recent information is maintaining levels between 50-70 and low normal is 30. Recommended 50,000 units every two weeks to once a month depending on the level. \par \par  \par  F/U in 4 months with full PFT's \par Patient is encouraged to call with any changes, concerns, or questions.

## 2019-03-26 NOTE — PROCEDURE
[FreeTextEntry1] : PFT- spi reveals normal flows; FEV1 was 2.02L which is 89% of predicted; flattened inspiratory limb.\par \par He had a CT scan of the chest performed on that showed patent central airways. Incidental tracheal \par bronchus. Moderate upper lung predominant centrilobular and paraseptal emphysema. Basilar predominant peripheral reticulations, ground glass opacities and tractional bronchiectasis are again noted, grossly unchanged from 2016, slightly progressed from 2010.  \par \par The patient completed am initial 6-minute walk/exercise study without oxygen in which the Lowest Pulse Ox was 86%; there was evidence of slight dyspnea. The pt then completed a second 6-minute walk/exercise study with 2L of oxygen, and the Lowest Pulse Ox was 86%. There was evidence of slight dyspnea; the pt walked 4 laps or 65.2 meters. The pt reported severe leg pain.

## 2019-03-26 NOTE — REASON FOR VISIT
[Follow-Up] : a follow-up visit [FreeTextEntry1] : Bronchiectasis, COPD, dyspnea, GERD, and interstitial lung disease

## 2019-03-26 NOTE — HISTORY OF PRESENT ILLNESS
[FreeTextEntry1] : Mr. Post is an 88 year old male with a history of bronchiectasis, COPD, dyspnea, GERD, and interstitial lung disease presenting to the office today for a follow up visit. His chief complaint is leg pain \par - His daughter states that he is constipated. \par - His daughter states that he was in A-fib for 2 months but did not feel anything \par - his bowels are regular\par - His weight is stable\par - His daughter reports that he snacks all day and does not eat well. \par - He has some LE edema. \par - He reports some pain in his legs when he goes up and down the steps. \par - From what his daughter observes, he doses off intermittently. \par - His daughter reports that his balance is "so-so"\par - He denies any headaches, nausea, vomiting, fever, chills, sweats, chest pain, chest pressure, SOB, coughing, wheezing, diarrhea, constipation, dysphagia, myalgias, dizziness, itchy eyes, itchy ears, heartburn, reflux, or sour taste in the mouth.

## 2019-04-17 ENCOUNTER — TRANSCRIPTION ENCOUNTER (OUTPATIENT)
Age: 84
End: 2019-04-17

## 2019-04-19 ENCOUNTER — APPOINTMENT (OUTPATIENT)
Dept: INTERNAL MEDICINE | Facility: CLINIC | Age: 84
End: 2019-04-19
Payer: MEDICARE

## 2019-04-19 ENCOUNTER — LABORATORY RESULT (OUTPATIENT)
Age: 84
End: 2019-04-19

## 2019-04-19 VITALS
BODY MASS INDEX: 28.19 KG/M2 | DIASTOLIC BLOOD PRESSURE: 60 MMHG | HEIGHT: 68 IN | HEART RATE: 54 BPM | TEMPERATURE: 97.4 F | WEIGHT: 186 LBS | SYSTOLIC BLOOD PRESSURE: 142 MMHG | OXYGEN SATURATION: 96 %

## 2019-04-19 DIAGNOSIS — N18.6 END STAGE RENAL DISEASE: ICD-10-CM

## 2019-04-19 DIAGNOSIS — I25.10 ATHEROSCLEROTIC HEART DISEASE OF NATIVE CORONARY ARTERY W/OUT ANGINA PECTORIS: ICD-10-CM

## 2019-04-19 DIAGNOSIS — Z99.2 END STAGE RENAL DISEASE: ICD-10-CM

## 2019-04-19 DIAGNOSIS — N18.3 CHRONIC KIDNEY DISEASE, STAGE 3 (MODERATE): ICD-10-CM

## 2019-04-19 DIAGNOSIS — D63.1 END STAGE RENAL DISEASE: ICD-10-CM

## 2019-04-19 PROCEDURE — 99214 OFFICE O/P EST MOD 30 MIN: CPT | Mod: 25

## 2019-04-19 PROCEDURE — 90750 HZV VACC RECOMBINANT IM: CPT | Mod: GY

## 2019-04-19 PROCEDURE — 90471 IMMUNIZATION ADMIN: CPT | Mod: GY

## 2019-04-19 NOTE — HISTORY OF PRESENT ILLNESS
[de-identified] : 87 yo  COPD  bronchiectasis\par \par AFIB no longer multaq   Amiodarone + metoprolol  + xarelto\par MR  AS  EF 40%\par SH: [employer]   112

## 2019-04-19 NOTE — HISTORY OF PRESENT ILLNESS
[de-identified] : 87 yo  COPD  bronchiectasis\par \par AFIB no longer multaq   Amiodarone + metoprolol  + xarelto\par MR  AS  EF 40%\par SH: [employer]   112

## 2019-04-19 NOTE — PHYSICAL EXAM
[No Acute Distress] : no acute distress [Well Developed] : well developed [Well Nourished] : well nourished [Well-Appearing] : well-appearing [Normal Sclera/Conjunctiva] : normal sclera/conjunctiva [PERRL] : pupils equal round and reactive to light [Normal Outer Ear/Nose] : the outer ears and nose were normal in appearance [EOMI] : extraocular movements intact [Supple] : supple [Normal Oropharynx] : the oropharynx was normal [No JVD] : no jugular venous distention [No Lymphadenopathy] : no lymphadenopathy [Thyroid Normal, No Nodules] : the thyroid was normal and there were no nodules present [Clear to Auscultation] : lungs were clear to auscultation bilaterally [No Respiratory Distress] : no respiratory distress  [Normal Rate] : normal rate  [No Accessory Muscle Use] : no accessory muscle use [Regular Rhythm] : with a regular rhythm [Normal S1, S2] : normal S1 and S2 [No Carotid Bruits] : no carotid bruits [No Abdominal Bruit] : a ~M bruit was not heard ~T in the abdomen [No Murmur] : no murmur heard [Pedal Pulses Present] : the pedal pulses are present [No Varicosities] : no varicosities [No Extremity Clubbing/Cyanosis] : no extremity clubbing/cyanosis [No Edema] : there was no peripheral edema [No Palpable Aorta] : no palpable aorta [Non Tender] : non-tender [Soft] : abdomen soft [No HSM] : no HSM [No Masses] : no abdominal mass palpated [Non-distended] : non-distended [Normal Posterior Cervical Nodes] : no posterior cervical lymphadenopathy [Normal Bowel Sounds] : normal bowel sounds [Normal Anterior Cervical Nodes] : no anterior cervical lymphadenopathy [No CVA Tenderness] : no CVA  tenderness [No Joint Swelling] : no joint swelling [Grossly Normal Strength/Tone] : grossly normal strength/tone [No Spinal Tenderness] : no spinal tenderness [Normal Gait] : normal gait [No Rash] : no rash [Coordination Grossly Intact] : coordination grossly intact [No Focal Deficits] : no focal deficits [Normal Affect] : the affect was normal [Deep Tendon Reflexes (DTR)] : deep tendon reflexes were 2+ and symmetric [Normal Insight/Judgement] : insight and judgment were intact

## 2019-04-19 NOTE — REVIEW OF SYSTEMS
[Discharge] : no discharge [Fever] : no fever [Chest Pain] : no chest pain [Pain] : no pain [Shortness Of Breath] : no shortness of breath [Palpitations] : no palpitations [Wheezing] : no wheezing [Dysuria] : no dysuria [Abdominal Pain] : no abdominal pain [Joint Pain] : no joint pain [Itching] : no itching [Headache] : no headache

## 2019-04-19 NOTE — PHYSICAL EXAM
[No Acute Distress] : no acute distress [Well Developed] : well developed [Well Nourished] : well nourished [Normal Sclera/Conjunctiva] : normal sclera/conjunctiva [PERRL] : pupils equal round and reactive to light [Well-Appearing] : well-appearing [EOMI] : extraocular movements intact [Normal Outer Ear/Nose] : the outer ears and nose were normal in appearance [Normal Oropharynx] : the oropharynx was normal [Supple] : supple [No JVD] : no jugular venous distention [No Lymphadenopathy] : no lymphadenopathy [Thyroid Normal, No Nodules] : the thyroid was normal and there were no nodules present [No Respiratory Distress] : no respiratory distress  [Clear to Auscultation] : lungs were clear to auscultation bilaterally [No Accessory Muscle Use] : no accessory muscle use [Normal Rate] : normal rate  [Regular Rhythm] : with a regular rhythm [Normal S1, S2] : normal S1 and S2 [No Murmur] : no murmur heard [No Abdominal Bruit] : a ~M bruit was not heard ~T in the abdomen [No Carotid Bruits] : no carotid bruits [No Varicosities] : no varicosities [Pedal Pulses Present] : the pedal pulses are present [No Extremity Clubbing/Cyanosis] : no extremity clubbing/cyanosis [No Edema] : there was no peripheral edema [No Palpable Aorta] : no palpable aorta [Non Tender] : non-tender [Soft] : abdomen soft [No Masses] : no abdominal mass palpated [Non-distended] : non-distended [No HSM] : no HSM [Normal Posterior Cervical Nodes] : no posterior cervical lymphadenopathy [Normal Bowel Sounds] : normal bowel sounds [Normal Anterior Cervical Nodes] : no anterior cervical lymphadenopathy [No CVA Tenderness] : no CVA  tenderness [No Spinal Tenderness] : no spinal tenderness [No Joint Swelling] : no joint swelling [Grossly Normal Strength/Tone] : grossly normal strength/tone [Normal Gait] : normal gait [No Rash] : no rash [No Focal Deficits] : no focal deficits [Coordination Grossly Intact] : coordination grossly intact [Normal Insight/Judgement] : insight and judgment were intact [Deep Tendon Reflexes (DTR)] : deep tendon reflexes were 2+ and symmetric [Normal Affect] : the affect was normal

## 2019-04-19 NOTE — REVIEW OF SYSTEMS
[Fever] : no fever [Discharge] : no discharge [Pain] : no pain [Chest Pain] : no chest pain [Shortness Of Breath] : no shortness of breath [Palpitations] : no palpitations [Wheezing] : no wheezing [Dysuria] : no dysuria [Abdominal Pain] : no abdominal pain [Itching] : no itching [Joint Pain] : no joint pain [Headache] : no headache

## 2019-04-22 LAB
ANION GAP SERPL CALC-SCNC: 12 MMOL/L
BASOPHILS # BLD AUTO: 0.09 K/UL
BASOPHILS NFR BLD AUTO: 1.7 %
BUN SERPL-MCNC: 20 MG/DL
CALCIUM SERPL-MCNC: 9.2 MG/DL
CHLORIDE SERPL-SCNC: 103 MMOL/L
CO2 SERPL-SCNC: 24 MMOL/L
CREAT SERPL-MCNC: 1.52 MG/DL
EOSINOPHIL # BLD AUTO: 0.14 K/UL
EOSINOPHIL NFR BLD AUTO: 2.6 %
FERRITIN SERPL-MCNC: 113 NG/ML
GLUCOSE SERPL-MCNC: 86 MG/DL
HCT VFR BLD CALC: 36.7 %
HGB BLD-MCNC: 11.2 G/DL
IRON SATN MFR SERPL: 36 %
IRON SERPL-MCNC: 112 UG/DL
LYMPHOCYTES # BLD AUTO: 0.8 K/UL
LYMPHOCYTES NFR BLD AUTO: 14.8 %
MAN DIFF?: NORMAL
MCHC RBC-ENTMCNC: 30.5 GM/DL
MCHC RBC-ENTMCNC: 32.7 PG
MCV RBC AUTO: 107.3 FL
MONOCYTES # BLD AUTO: 0.61 K/UL
MONOCYTES NFR BLD AUTO: 11.3 %
NEUTROPHILS # BLD AUTO: 3.75 K/UL
NEUTROPHILS NFR BLD AUTO: 69.6 %
PLATELET # BLD AUTO: 170 K/UL
POTASSIUM SERPL-SCNC: 4.7 MMOL/L
RBC # BLD: 3.42 M/UL
RBC # FLD: 14.7 %
SODIUM SERPL-SCNC: 139 MMOL/L
TIBC SERPL-MCNC: 307 UG/DL
UIBC SERPL-MCNC: 195 UG/DL
VIT B12 SERPL-MCNC: 810 PG/ML
WBC # FLD AUTO: 5.39 K/UL

## 2019-05-26 ENCOUNTER — EMERGENCY (EMERGENCY)
Facility: HOSPITAL | Age: 84
LOS: 1 days | Discharge: HOME | End: 2019-05-26
Attending: EMERGENCY MEDICINE
Payer: MEDICARE

## 2019-05-26 VITALS
SYSTOLIC BLOOD PRESSURE: 164 MMHG | OXYGEN SATURATION: 97 % | TEMPERATURE: 98 F | DIASTOLIC BLOOD PRESSURE: 66 MMHG | HEART RATE: 63 BPM | RESPIRATION RATE: 16 BRPM

## 2019-05-26 VITALS
WEIGHT: 184.97 LBS | OXYGEN SATURATION: 94 % | DIASTOLIC BLOOD PRESSURE: 67 MMHG | HEIGHT: 68 IN | RESPIRATION RATE: 18 BRPM | TEMPERATURE: 97 F | SYSTOLIC BLOOD PRESSURE: 133 MMHG | HEART RATE: 69 BPM

## 2019-05-26 LAB
ALBUMIN SERPL ELPH-MCNC: 3.8 G/DL — SIGNIFICANT CHANGE UP (ref 3.3–5)
ALP SERPL-CCNC: 81 U/L — SIGNIFICANT CHANGE UP (ref 40–120)
ALT FLD-CCNC: 20 U/L — SIGNIFICANT CHANGE UP (ref 10–45)
ANION GAP SERPL CALC-SCNC: 12 MMOL/L — SIGNIFICANT CHANGE UP (ref 5–17)
APPEARANCE UR: CLEAR — SIGNIFICANT CHANGE UP
APTT BLD: 46.3 SEC — HIGH (ref 27.5–36.3)
AST SERPL-CCNC: 30 U/L — SIGNIFICANT CHANGE UP (ref 10–40)
BACTERIA # UR AUTO: 0 — SIGNIFICANT CHANGE UP
BASOPHILS # BLD AUTO: 0 K/UL — SIGNIFICANT CHANGE UP (ref 0–0.2)
BASOPHILS NFR BLD AUTO: 0.8 % — SIGNIFICANT CHANGE UP (ref 0–2)
BILIRUB SERPL-MCNC: 1.2 MG/DL — SIGNIFICANT CHANGE UP (ref 0.2–1.2)
BILIRUB UR-MCNC: NEGATIVE — SIGNIFICANT CHANGE UP
BUN SERPL-MCNC: 21 MG/DL — SIGNIFICANT CHANGE UP (ref 7–23)
CALCIUM SERPL-MCNC: 9.3 MG/DL — SIGNIFICANT CHANGE UP (ref 8.4–10.5)
CHLORIDE SERPL-SCNC: 102 MMOL/L — SIGNIFICANT CHANGE UP (ref 96–108)
CO2 SERPL-SCNC: 25 MMOL/L — SIGNIFICANT CHANGE UP (ref 22–31)
COLOR SPEC: YELLOW — SIGNIFICANT CHANGE UP
CREAT SERPL-MCNC: 1.46 MG/DL — HIGH (ref 0.5–1.3)
DIFF PNL FLD: NEGATIVE — SIGNIFICANT CHANGE UP
EOSINOPHIL # BLD AUTO: 0.1 K/UL — SIGNIFICANT CHANGE UP (ref 0–0.5)
EOSINOPHIL NFR BLD AUTO: 1.2 % — SIGNIFICANT CHANGE UP (ref 0–6)
EPI CELLS # UR: 1 /HPF — SIGNIFICANT CHANGE UP
GLUCOSE SERPL-MCNC: 108 MG/DL — HIGH (ref 70–99)
GLUCOSE UR QL: NEGATIVE — SIGNIFICANT CHANGE UP
HCT VFR BLD CALC: 34.9 % — LOW (ref 39–50)
HGB BLD-MCNC: 11.8 G/DL — LOW (ref 13–17)
HYALINE CASTS # UR AUTO: 2 /LPF — SIGNIFICANT CHANGE UP (ref 0–2)
INR BLD: 2.31 RATIO — HIGH (ref 0.88–1.16)
KETONES UR-MCNC: SIGNIFICANT CHANGE UP
LEUKOCYTE ESTERASE UR-ACNC: NEGATIVE — SIGNIFICANT CHANGE UP
LYMPHOCYTES # BLD AUTO: 1 K/UL — SIGNIFICANT CHANGE UP (ref 1–3.3)
LYMPHOCYTES # BLD AUTO: 22.1 % — SIGNIFICANT CHANGE UP (ref 13–44)
MCHC RBC-ENTMCNC: 33.7 GM/DL — SIGNIFICANT CHANGE UP (ref 32–36)
MCHC RBC-ENTMCNC: 34.3 PG — HIGH (ref 27–34)
MCV RBC AUTO: 102 FL — HIGH (ref 80–100)
MONOCYTES # BLD AUTO: 0.4 K/UL — SIGNIFICANT CHANGE UP (ref 0–0.9)
MONOCYTES NFR BLD AUTO: 9.2 % — SIGNIFICANT CHANGE UP (ref 2–14)
NEUTROPHILS # BLD AUTO: 3.1 K/UL — SIGNIFICANT CHANGE UP (ref 1.8–7.4)
NEUTROPHILS NFR BLD AUTO: 66.8 % — SIGNIFICANT CHANGE UP (ref 43–77)
NITRITE UR-MCNC: NEGATIVE — SIGNIFICANT CHANGE UP
PH UR: 7 — SIGNIFICANT CHANGE UP (ref 5–8)
PLATELET # BLD AUTO: 160 K/UL — SIGNIFICANT CHANGE UP (ref 150–400)
POTASSIUM SERPL-MCNC: 4.2 MMOL/L — SIGNIFICANT CHANGE UP (ref 3.5–5.3)
POTASSIUM SERPL-SCNC: 4.2 MMOL/L — SIGNIFICANT CHANGE UP (ref 3.5–5.3)
PROT SERPL-MCNC: 6.9 G/DL — SIGNIFICANT CHANGE UP (ref 6–8.3)
PROT UR-MCNC: ABNORMAL
PROTHROM AB SERPL-ACNC: 27.3 SEC — HIGH (ref 10–12.9)
RBC # BLD: 3.44 M/UL — LOW (ref 4.2–5.8)
RBC # FLD: 12.7 % — SIGNIFICANT CHANGE UP (ref 10.3–14.5)
RBC CASTS # UR COMP ASSIST: 2 /HPF — SIGNIFICANT CHANGE UP (ref 0–4)
SODIUM SERPL-SCNC: 139 MMOL/L — SIGNIFICANT CHANGE UP (ref 135–145)
SP GR SPEC: 1.02 — SIGNIFICANT CHANGE UP (ref 1.01–1.02)
UROBILINOGEN FLD QL: ABNORMAL
WBC # BLD: 4.7 K/UL — SIGNIFICANT CHANGE UP (ref 3.8–10.5)
WBC # FLD AUTO: 4.7 K/UL — SIGNIFICANT CHANGE UP (ref 3.8–10.5)
WBC UR QL: 3 /HPF — SIGNIFICANT CHANGE UP (ref 0–5)

## 2019-05-26 PROCEDURE — 85027 COMPLETE CBC AUTOMATED: CPT

## 2019-05-26 PROCEDURE — 87086 URINE CULTURE/COLONY COUNT: CPT

## 2019-05-26 PROCEDURE — 85610 PROTHROMBIN TIME: CPT

## 2019-05-26 PROCEDURE — 70450 CT HEAD/BRAIN W/O DYE: CPT | Mod: 26

## 2019-05-26 PROCEDURE — 85730 THROMBOPLASTIN TIME PARTIAL: CPT

## 2019-05-26 PROCEDURE — 71045 X-RAY EXAM CHEST 1 VIEW: CPT

## 2019-05-26 PROCEDURE — 70450 CT HEAD/BRAIN W/O DYE: CPT

## 2019-05-26 PROCEDURE — 81001 URINALYSIS AUTO W/SCOPE: CPT

## 2019-05-26 PROCEDURE — 71045 X-RAY EXAM CHEST 1 VIEW: CPT | Mod: 26

## 2019-05-26 PROCEDURE — 93005 ELECTROCARDIOGRAM TRACING: CPT

## 2019-05-26 PROCEDURE — 71250 CT THORAX DX C-: CPT | Mod: 26

## 2019-05-26 PROCEDURE — 80053 COMPREHEN METABOLIC PANEL: CPT

## 2019-05-26 PROCEDURE — 99283 EMERGENCY DEPT VISIT LOW MDM: CPT | Mod: 25

## 2019-05-26 PROCEDURE — 93010 ELECTROCARDIOGRAM REPORT: CPT

## 2019-05-26 PROCEDURE — 82962 GLUCOSE BLOOD TEST: CPT

## 2019-05-26 PROCEDURE — 99284 EMERGENCY DEPT VISIT MOD MDM: CPT | Mod: 25

## 2019-05-26 PROCEDURE — 71250 CT THORAX DX C-: CPT

## 2019-05-26 NOTE — ED PROVIDER NOTE - PHYSICAL EXAMINATION
see attending attestation ****ATTENDING**** Patient is awake,alert,oriented x 3. Patient's chest is clear to ausculation, +s1s2. Abdomen is soft nd/nt +BS. Moving all 4 extremities.

## 2019-05-26 NOTE — ED PROVIDER NOTE - NSFOLLOWUPCLINICS_GEN_ALL_ED_FT
[facility] - Primary Care  Primary Care  [address] [place] [address]  Phone: [phone]  Fax:   Follow Up Time:

## 2019-05-26 NOTE — ED PROVIDER NOTE - OBJECTIVE STATEMENT
88 year-old male with history of CAD with stents on Xarelto, CABGx5 (1995), atrial fibrillation, HTN, HLD, pulmonary fibrosis, anemia presents to the Emergency Department for AMS.  Patient was seen by family yesterday and was noted to confused; responding inappropriately to questioning and resolved over the course of 10 minutes.  Similar symptoms noted this AM; disoriented, confused lasting for 30 minutes.  No fevers, chills, nausea, vomiting, URI symptoms, chest pain, shortness of breath, abdominal pain, dysuria.  + LE swelling b/l.  Patient is much improved compared to prior.  PCP - Ambrose Handley

## 2019-05-26 NOTE — ED PROVIDER NOTE - PROGRESS NOTE DETAILS
James VERDUGO MD PGY1: Patient signed out to me for reported altered mental status. Has returned to mental status. Patient no longer delirius. Patient desires to go home. CT Chest prelim by me and Dr Snyder of no visible pneumonia relayed to patietn and patitent and family are comfortable going home and will call to follow-up CT scan results if there is a pneumonia. We are told that even if there is a pneumonia, patietn and family would prefer outpatietn treatment.

## 2019-05-26 NOTE — ED PROVIDER NOTE - NSFOLLOWUPINSTRUCTIONS_ED_ALL_ED_FT
Please return to the ED for any concerns. Please follow-up with a neurologist or your primary care doctor in the netx week. Please call in 2 hours to confirm your final CT scan results, we do not think there is a pneumonia at this time.

## 2019-05-26 NOTE — ED ADULT NURSE REASSESSMENT NOTE - NS ED NURSE REASSESS COMMENT FT1
pt at CT scan
report received from RN Cat. Pt appears to be in no acute distress. VSS. pt awaiting CT results. safety maintained.
Family at bedside, patient and family updated on plan of care. Comfort and safety measures in place. Bed in lowest position, side rails up. Call bell within reach. Awaiting dispo.

## 2019-05-26 NOTE — ED PROVIDER NOTE - ATTENDING CONTRIBUTION TO CARE
I agree with the resident note above.   Patient BIB family for confusion. Daughter and son at bedside state patient lives by himself and had episode of confusion. Denies any trauma. Now back to baseline. Patient non focal at this time.  Check labs, CT to eval for infection.

## 2019-05-26 NOTE — ED ADULT NURSE NOTE - OBJECTIVE STATEMENT
89 yo M presents to ED A+Ox3 accompanied by daughter and son c/o altered mental status. As per son and daughter, patient had episode of confusion yesterday and again this morning. As per family, patient "didn't know where he was and who we were." As per family patient is currently at his baseline mental status. Pt. currently A+Ox3, denies pain/discomfort. Pt. reports that while watching television it appears "hairy." Patient and family deny patient having recent falls. Pt. is sitting up in stretcher in no apparent distress. Breathing unlabored on RA. Skin warm pink and dry. Comfort and safety measures in place. Family at bedside. Call bell within reach.

## 2019-05-26 NOTE — ED ADULT NURSE NOTE - INTERVENTIONS DEFINITIONS
Physically safe environment: no spills, clutter or unnecessary equipment/Provide visual clues: red socks/Monitor gait and stability/Reinforce activity limits and safety measures with patient and family/Monitor for mental status changes and reorient to person, place, and time/[place] to call system/Instruct patient to call for assistance/Stretcher in lowest position, wheels locked, appropriate side rails in place

## 2019-05-27 LAB
CULTURE RESULTS: SIGNIFICANT CHANGE UP
SPECIMEN SOURCE: SIGNIFICANT CHANGE UP

## 2019-06-03 ENCOUNTER — RX RENEWAL (OUTPATIENT)
Age: 84
End: 2019-06-03

## 2019-06-08 ENCOUNTER — RX RENEWAL (OUTPATIENT)
Age: 84
End: 2019-06-08

## 2019-06-08 ENCOUNTER — TRANSCRIPTION ENCOUNTER (OUTPATIENT)
Age: 84
End: 2019-06-08

## 2019-06-18 ENCOUNTER — APPOINTMENT (OUTPATIENT)
Dept: INTERNAL MEDICINE | Facility: CLINIC | Age: 84
End: 2019-06-18
Payer: MEDICARE

## 2019-06-18 PROCEDURE — 90750 HZV VACC RECOMBINANT IM: CPT | Mod: GY

## 2019-06-18 PROCEDURE — 90471 IMMUNIZATION ADMIN: CPT | Mod: GY

## 2019-08-20 ENCOUNTER — APPOINTMENT (OUTPATIENT)
Dept: PULMONOLOGY | Facility: CLINIC | Age: 84
End: 2019-08-20
Payer: MEDICARE

## 2019-08-20 ENCOUNTER — NON-APPOINTMENT (OUTPATIENT)
Age: 84
End: 2019-08-20

## 2019-08-20 VITALS
SYSTOLIC BLOOD PRESSURE: 120 MMHG | HEART RATE: 67 BPM | OXYGEN SATURATION: 93 % | DIASTOLIC BLOOD PRESSURE: 62 MMHG | WEIGHT: 186 LBS | RESPIRATION RATE: 16 BRPM | BODY MASS INDEX: 28.19 KG/M2 | HEIGHT: 68 IN

## 2019-08-20 PROCEDURE — 94010 BREATHING CAPACITY TEST: CPT

## 2019-08-20 PROCEDURE — 99214 OFFICE O/P EST MOD 30 MIN: CPT | Mod: 25

## 2019-08-20 NOTE — ASSESSMENT
[FreeTextEntry1] : Mr. Post's shortness of breath is multifactorial due to pulmonary fibrosis, COPD, on Amiodarone, bronchiectasis, and poor mechanics as well as underlying cardiac disease/Afib. He stable from a pulmonary perspective. His most recent CT (11/23/2018) was unchanged from 2016. \par \par His shortness of breath is multifactorial due to:\par - lung diseases - ILDz\par -poor breathing mechanics \par -overweight/out of shape\par -CAD \par \par problem 1: COPD\par - Continue  Anoro 1 inhalation QD\par - Continue Ventolin 2 inhalations PRN up to Q6H \par \par -Inhaler technique reviewed as well as oral hygiene techniques reviewed with patient. Avoidance of cold air, extremes of temperature, rescue inhaler should be used before exercise. Order of medication reviewed with patient. Recommended use of a cool mist humidifier in the bedroom. \par \par Asthma is believed to be caused by inherited (genetic) and environmental factor, but its exact cause is unknown. Asthma may be triggered by allergens, lung infections, or irritants in the air. Asthma triggers are different for each person \par \par  problem 2: UIP/ Pulmonary fibrosis (stable)\par -follow up CT both prone and supine stable 11/2018\par - Next CT 11/2019\par -Patient Refused Esbriet / Ofev \par \par problem 3: Amiodarone therapy \par -To have full PFTs\par Amiodarone/bleomycin/methotrexate and other drugs have been associated with pulmonary parenchymal damage. These drugs require pulmonary function testing including DLCO regularly and possible CT/CXR if respiratory complaints develop. PFTs should be performed at 6 month intervals. \par \par problem 4: GERD\par -continue Pepcid 40mg QHS\par \par -Rule of 2s: avoid eating too much, eating too fast, eating too late, eating too spicy, eating two hours before bed\par -Things to avoid including overeating, spicy foods, tight clothing, eating within three hours of bed, this list is not all inclusive. \par -For treatment of reflux, possible options discussed including diet control, H2 blockers, PPIs, as well as coating motility agents discussed as treatment options. Timing of meals and proximity of last meal to sleep were discussed. If symptoms persist, a formal gastrointestinal evaluation is needed.\par \par problem 5: overweight/out of shape\par Weight loss, exercise, and diet control were discussed and are highly encouraged. Treatment options were given such as, aqua therapy, and contacting a nutritionist. Recommended to use the elliptical, stationary bike, less use of treadmill. Mindful eating was explained to the patient Obesity is associated with worsening asthma, shortness of breath, and potential for cardiac disease, diabetes, and other underlying medical conditions. \par \par problem 6: poor breathing mechanics\par -Proper breathing techniques were reviewed with an emphasis of exhalation. Patient instructed to breath in for 1 second and out for four seconds. Patient was encouraged to not talk while walking. \par \par Problem 7: Low Vitamin D\par -recommended to take Vitamin D, 50,000 units every 2 weeks\par \par Low Vitamin D has been associated with asthma exacerbations and increased allergic symptoms. The goal based on recent information is maintaining levels between 50-70 and low normal is 30. Recommended 50,000 units every two weeks to once a month depending on the level. \par \par  \par  F/U in 4 months with full PFT's and 6 minute walk\par Patient is encouraged to call with any changes, concerns, or questions.

## 2019-08-20 NOTE — PHYSICAL EXAM
[General Appearance - Well Developed] : well developed [Normal Appearance] : normal appearance [Well Groomed] : well groomed [General Appearance - Well Nourished] : well nourished [General Appearance - In No Acute Distress] : no acute distress [No Deformities] : no deformities [Normal Conjunctiva] : the conjunctiva exhibited no abnormalities [Eyelids - No Xanthelasma] : the eyelids demonstrated no xanthelasmas [Normal Oropharynx] : normal oropharynx [II] : II [Neck Appearance] : the appearance of the neck was normal [Neck Cervical Mass (___cm)] : no neck mass was observed [Jugular Venous Distention Increased] : there was no jugular-venous distention [Thyroid Diffuse Enlargement] : the thyroid was not enlarged [Thyroid Nodule] : there were no palpable thyroid nodules [Heart Rate And Rhythm] : heart rate and rhythm were normal [Heart Sounds] : normal S1 and S2 [Exaggerated Use Of Accessory Muscles For Inspiration] : no accessory muscle use [Abdomen Tenderness] : non-tender [Abdomen Soft] : soft [Abdomen Mass (___ Cm)] : no abdominal mass palpated [Abnormal Walk] : normal gait [Gait - Sufficient For Exercise Testing] : the gait was sufficient for exercise testing [Cyanosis, Localized] : no localized cyanosis [Nail Clubbing] : no clubbing of the fingernails [Petechial Hemorrhages (___cm)] : no petechial hemorrhages [1+ Pitting] : 1+  pitting [] : no rash [Skin Color & Pigmentation] : normal skin color and pigmentation [No Venous Stasis] : no venous stasis [Skin Lesions] : no skin lesions [No Skin Ulcers] : no skin ulcer [No Xanthoma] : no  xanthoma was observed [Deep Tendon Reflexes (DTR)] : deep tendon reflexes were 2+ and symmetric [Sensation] : the sensory exam was normal to light touch and pinprick [No Focal Deficits] : no focal deficits [Oriented To Time, Place, And Person] : oriented to person, place, and time [Impaired Insight] : insight and judgment were intact [Affect] : the affect was normal [FreeTextEntry1] : I:E ratio 1:3; mild inspiratory crackles 1/3 of the way up b/l [FreeTextEntry2] : 1+ LE edema

## 2019-08-20 NOTE — ADDENDUM
[FreeTextEntry1] : Documented by Del Blanco acting as a scribe for Dr. Louis Grigsby on 08/20/2019.\par \par All medical record entries made by the Scribe were at my, Dr. Louis Grigsby's, direction and personally dictated by me on 08/20/2019. I have reviewed the chart and agree that the record accurately reflects my personal performance of the history, physical exam, assessment and plan. I have also personally directed, reviewed, and agree with the discharge instructions. \par \par

## 2019-08-20 NOTE — REVIEW OF SYSTEMS
[Negative] : Sleep Disorder [Cough] : cough [As Noted in HPI] : as noted in HPI [Watery Eyes] : ~T eyes watering / discharge

## 2019-08-20 NOTE — HISTORY OF PRESENT ILLNESS
[FreeTextEntry1] : Mr. Post is an 89 year old male with a history of bronchiectasis, COPD, dyspnea, GERD, and interstitial lung disease presenting to the office today for a follow up visit. His chief complaint is poor hearing.\par -he reports he is doing well\par -he notes having dizziness when walking up and down stairs\par -he reports having watery eyes\par -he reports sleeping well\par -his daughter notes his leg has some mild swelling\par -he reports his breathing is good\par -he reports coughing, with minimal mucus being brought up\par -He notes His bowels are regular\par -he notes his senses of vision, smell and taste are good\par -his daughter notes he could eat more healthily, and he snacks often\par -she notes he is still on Amiodarone\par -his daughter notes his memory is consistently poor\par -she notes he isnt walking often\par -she notes his balance is okay\par -he denies any nocturia, chest pain, chest pressure, diarrhea, constipation, dysphagia, dizziness, sour taste in the mouth, itchy eyes, itchy ears, heartburn, reflux

## 2019-08-20 NOTE — PROCEDURE
[FreeTextEntry1] : PFT revealed normal flows, with a FEV1 of 2.00L, which is 89% of predicted, with a normal flow volume loop \par \par 6 minute walk test reveals a low saturation of 87% with moderate-severe dyspnea; walked 97.8 meters before stopping due to low oxygen level and SOB.  Patient repeated walk test with 2 LPM of supplemental oxygen revealing a low oxygen saturation of 86% with moderate-severe dyspnea, before stopping due to low O2 level and pain in his legs; walked 114.1

## 2019-08-20 NOTE — REASON FOR VISIT
[Follow-Up] : a follow-up visit [FreeTextEntry1] : On amiodarone, Bronchiectasis, COPD, dyspnea, GERD, and interstitial lung disease

## 2019-08-23 ENCOUNTER — NON-APPOINTMENT (OUTPATIENT)
Age: 84
End: 2019-08-23

## 2019-08-23 ENCOUNTER — LABORATORY RESULT (OUTPATIENT)
Age: 84
End: 2019-08-23

## 2019-08-23 ENCOUNTER — APPOINTMENT (OUTPATIENT)
Dept: INTERNAL MEDICINE | Facility: CLINIC | Age: 84
End: 2019-08-23
Payer: MEDICARE

## 2019-08-23 VITALS
OXYGEN SATURATION: 85 % | BODY MASS INDEX: 28.04 KG/M2 | HEIGHT: 68 IN | SYSTOLIC BLOOD PRESSURE: 140 MMHG | DIASTOLIC BLOOD PRESSURE: 70 MMHG | WEIGHT: 185 LBS | HEART RATE: 72 BPM

## 2019-08-23 VITALS — OXYGEN SATURATION: 97 % | SYSTOLIC BLOOD PRESSURE: 118 MMHG | DIASTOLIC BLOOD PRESSURE: 66 MMHG

## 2019-08-23 DIAGNOSIS — N18.9 CHRONIC KIDNEY DISEASE, UNSPECIFIED: ICD-10-CM

## 2019-08-23 DIAGNOSIS — I48.91 UNSPECIFIED ATRIAL FIBRILLATION: ICD-10-CM

## 2019-08-23 DIAGNOSIS — D63.1 CHRONIC KIDNEY DISEASE, UNSPECIFIED: ICD-10-CM

## 2019-08-23 DIAGNOSIS — I25.5 ISCHEMIC CARDIOMYOPATHY: ICD-10-CM

## 2019-08-23 PROCEDURE — 99213 OFFICE O/P EST LOW 20 MIN: CPT | Mod: 25

## 2019-08-23 PROCEDURE — G0442 ANNUAL ALCOHOL SCREEN 15 MIN: CPT | Mod: 59

## 2019-08-23 PROCEDURE — 36415 COLL VENOUS BLD VENIPUNCTURE: CPT

## 2019-08-23 PROCEDURE — G0439: CPT

## 2019-08-23 PROCEDURE — 93000 ELECTROCARDIOGRAM COMPLETE: CPT

## 2019-08-23 RX ORDER — WHEELCHAIR
EACH MISCELLANEOUS
Qty: 1 | Refills: 0 | Status: ACTIVE | OUTPATIENT
Start: 2019-08-23

## 2019-08-23 RX ORDER — ZOSTER VACCINE RECOMBINANT, ADJUVANTED 50 MCG/0.5
50 KIT INTRAMUSCULAR
Qty: 1 | Refills: 0 | Status: COMPLETED | OUTPATIENT
Start: 2018-08-15 | End: 2019-08-23

## 2019-08-23 NOTE — PHYSICAL EXAM
[No Acute Distress] : no acute distress [Well Nourished] : well nourished [Well Developed] : well developed [Well-Appearing] : well-appearing [Normal Sclera/Conjunctiva] : normal sclera/conjunctiva [PERRL] : pupils equal round and reactive to light [EOMI] : extraocular movements intact [Normal Outer Ear/Nose] : the outer ears and nose were normal in appearance [Normal Oropharynx] : the oropharynx was normal [No Lymphadenopathy] : no lymphadenopathy [No JVD] : no jugular venous distention [Supple] : supple [Thyroid Normal, No Nodules] : the thyroid was normal and there were no nodules present [No Respiratory Distress] : no respiratory distress  [No Accessory Muscle Use] : no accessory muscle use [Clear to Auscultation] : lungs were clear to auscultation bilaterally [Normal Rate] : normal rate  [Regular Rhythm] : with a regular rhythm [Normal S1, S2] : normal S1 and S2 [No Murmur] : no murmur heard [No Carotid Bruits] : no carotid bruits [No Abdominal Bruit] : a ~M bruit was not heard ~T in the abdomen [No Varicosities] : no varicosities [Pedal Pulses Present] : the pedal pulses are present [No Edema] : there was no peripheral edema [No Palpable Aorta] : no palpable aorta [No Extremity Clubbing/Cyanosis] : no extremity clubbing/cyanosis [Soft] : abdomen soft [Non Tender] : non-tender [Non-distended] : non-distended [No Masses] : no abdominal mass palpated [No HSM] : no HSM [Normal Bowel Sounds] : normal bowel sounds [Normal Posterior Cervical Nodes] : no posterior cervical lymphadenopathy [Normal Anterior Cervical Nodes] : no anterior cervical lymphadenopathy [No CVA Tenderness] : no CVA  tenderness [No Spinal Tenderness] : no spinal tenderness [No Joint Swelling] : no joint swelling [Grossly Normal Strength/Tone] : grossly normal strength/tone [No Rash] : no rash [Coordination Grossly Intact] : coordination grossly intact [No Focal Deficits] : no focal deficits [Normal Gait] : normal gait [Deep Tendon Reflexes (DTR)] : deep tendon reflexes were 2+ and symmetric [Normal Affect] : the affect was normal [Normal Insight/Judgement] : insight and judgment were intact

## 2019-08-23 NOTE — HISTORY OF PRESENT ILLNESS
[FreeTextEntry1] : Late Accommodated [de-identified] : 88 yo COPD bronchiectasis, AFIB, MR, AS, EF 40% \par Fornmer

## 2019-08-26 LAB
ANION GAP SERPL CALC-SCNC: 12 MMOL/L
BASOPHILS # BLD AUTO: 0.04 K/UL
BASOPHILS NFR BLD AUTO: 0.9 %
BUN SERPL-MCNC: 21 MG/DL
CALCIUM SERPL-MCNC: 9.1 MG/DL
CALCIUM SERPL-MCNC: 9.1 MG/DL
CHLORIDE SERPL-SCNC: 103 MMOL/L
CK SERPL-CCNC: 82 U/L
CO2 SERPL-SCNC: 25 MMOL/L
CREAT SERPL-MCNC: 1.62 MG/DL
CREAT SPEC-SCNC: 231 MG/DL
CREAT SPEC-SCNC: 231 MG/DL
CREAT/PROT UR: 0.1 RATIO
EOSINOPHIL # BLD AUTO: 0.16 K/UL
EOSINOPHIL NFR BLD AUTO: 3.5 %
FERRITIN SERPL-MCNC: 109 NG/ML
FOLATE RBC-MCNC: 2320 NG/ML
GLUCOSE SERPL-MCNC: 97 MG/DL
HCT VFR BLD CALC: 35.6 %
HCT VFR BLD CALC: 35.6 %
HGB BLD-MCNC: 11.3 G/DL
IRON SATN MFR SERPL: 30 %
IRON SERPL-MCNC: 86 UG/DL
LYMPHOCYTES # BLD AUTO: 1.21 K/UL
LYMPHOCYTES NFR BLD AUTO: 26.9 %
MAN DIFF?: NORMAL
MCHC RBC-ENTMCNC: 31.7 GM/DL
MCHC RBC-ENTMCNC: 33.5 PG
MCV RBC AUTO: 105.6 FL
MICROALBUMIN 24H UR DL<=1MG/L-MCNC: 4 MG/DL
MICROALBUMIN/CREAT 24H UR-RTO: 18 MG/G
MONOCYTES # BLD AUTO: 0.35 K/UL
MONOCYTES NFR BLD AUTO: 7.8 %
NEUTROPHILS # BLD AUTO: 2.73 K/UL
NEUTROPHILS NFR BLD AUTO: 60.9 %
PARATHYROID HORMONE INTACT: 51 PG/ML
PLATELET # BLD AUTO: 162 K/UL
POTASSIUM SERPL-SCNC: 5 MMOL/L
PROT UR-MCNC: 29 MG/DL
RBC # BLD: 3.37 M/UL
RBC # FLD: 14.6 %
SODIUM SERPL-SCNC: 140 MMOL/L
TIBC SERPL-MCNC: 286 UG/DL
UIBC SERPL-MCNC: 200 UG/DL
VIT B12 SERPL-MCNC: 749 PG/ML
WBC # FLD AUTO: 4.48 K/UL

## 2019-09-09 ENCOUNTER — MEDICATION RENEWAL (OUTPATIENT)
Age: 84
End: 2019-09-09

## 2019-09-09 RX ORDER — FERROUS SULFATE TAB EC 325 MG (65 MG FE EQUIVALENT) 325 (65 FE) MG
325 (65 FE) TABLET DELAYED RESPONSE ORAL DAILY
Qty: 90 | Refills: 0 | Status: ACTIVE | COMMUNITY
Start: 1900-01-01 | End: 1900-01-01

## 2019-09-23 ENCOUNTER — RX RENEWAL (OUTPATIENT)
Age: 84
End: 2019-09-23

## 2019-09-23 ENCOUNTER — TRANSCRIPTION ENCOUNTER (OUTPATIENT)
Age: 84
End: 2019-09-23

## 2019-09-23 RX ORDER — METOPROLOL SUCCINATE 25 MG/1
25 TABLET, EXTENDED RELEASE ORAL
Qty: 90 | Refills: 3 | Status: ACTIVE | COMMUNITY
Start: 1900-01-01 | End: 1900-01-01

## 2019-10-15 ENCOUNTER — MEDICATION RENEWAL (OUTPATIENT)
Age: 84
End: 2019-10-15

## 2019-10-15 ENCOUNTER — TRANSCRIPTION ENCOUNTER (OUTPATIENT)
Age: 84
End: 2019-10-15

## 2019-10-15 RX ORDER — UMECLIDINIUM BROMIDE AND VILANTEROL TRIFENATATE 62.5; 25 UG/1; UG/1
62.5-25 POWDER RESPIRATORY (INHALATION) DAILY
Qty: 3 | Refills: 1 | Status: ACTIVE | COMMUNITY
Start: 2018-11-23 | End: 1900-01-01

## 2019-12-06 ENCOUNTER — FORM ENCOUNTER (OUTPATIENT)
Age: 84
End: 2019-12-06

## 2019-12-07 ENCOUNTER — APPOINTMENT (OUTPATIENT)
Dept: CT IMAGING | Facility: CLINIC | Age: 84
End: 2019-12-07
Payer: MEDICARE

## 2019-12-07 ENCOUNTER — OUTPATIENT (OUTPATIENT)
Dept: OUTPATIENT SERVICES | Facility: HOSPITAL | Age: 84
LOS: 1 days | End: 2019-12-07
Payer: MEDICARE

## 2019-12-07 DIAGNOSIS — R93.89 ABNORMAL FINDINGS ON DIAGNOSTIC IMAGING OF OTHER SPECIFIED BODY STRUCTURES: ICD-10-CM

## 2019-12-07 PROCEDURE — 71250 CT THORAX DX C-: CPT | Mod: 26

## 2019-12-07 PROCEDURE — 71250 CT THORAX DX C-: CPT

## 2019-12-12 ENCOUNTER — RX RENEWAL (OUTPATIENT)
Age: 84
End: 2019-12-12

## 2019-12-12 RX ORDER — FAMOTIDINE 40 MG/1
40 TABLET, FILM COATED ORAL
Qty: 90 | Refills: 1 | Status: ACTIVE | COMMUNITY
Start: 2018-11-23 | End: 1900-01-01

## 2019-12-17 ENCOUNTER — APPOINTMENT (OUTPATIENT)
Dept: PULMONOLOGY | Facility: CLINIC | Age: 84
End: 2019-12-17
Payer: MEDICARE

## 2019-12-17 ENCOUNTER — NON-APPOINTMENT (OUTPATIENT)
Age: 84
End: 2019-12-17

## 2019-12-17 VITALS
SYSTOLIC BLOOD PRESSURE: 90 MMHG | DIASTOLIC BLOOD PRESSURE: 43 MMHG | RESPIRATION RATE: 17 BRPM | WEIGHT: 170 LBS | HEART RATE: 68 BPM | HEIGHT: 68 IN | OXYGEN SATURATION: 98 % | BODY MASS INDEX: 25.76 KG/M2

## 2019-12-17 DIAGNOSIS — Z79.899 OTHER LONG TERM (CURRENT) DRUG THERAPY: ICD-10-CM

## 2019-12-17 DIAGNOSIS — J47.9 BRONCHIECTASIS, UNCOMPLICATED: ICD-10-CM

## 2019-12-17 DIAGNOSIS — J96.11 CHRONIC RESPIRATORY FAILURE WITH HYPOXIA: ICD-10-CM

## 2019-12-17 DIAGNOSIS — J44.1 CHRONIC OBSTRUCTIVE PULMONARY DISEASE WITH (ACUTE) EXACERBATION: ICD-10-CM

## 2019-12-17 DIAGNOSIS — E55.9 VITAMIN D DEFICIENCY, UNSPECIFIED: ICD-10-CM

## 2019-12-17 PROCEDURE — 94640 AIRWAY INHALATION TREATMENT: CPT | Mod: 59

## 2019-12-17 PROCEDURE — 94010 BREATHING CAPACITY TEST: CPT

## 2019-12-17 PROCEDURE — 99214 OFFICE O/P EST MOD 30 MIN: CPT | Mod: 25

## 2019-12-17 RX ORDER — PREDNISONE 10 MG/1
10 TABLET ORAL
Qty: 50 | Refills: 0 | Status: ACTIVE | COMMUNITY
Start: 2019-12-17 | End: 1900-01-01

## 2019-12-17 RX ORDER — AZITHROMYCIN 500 MG/1
500 TABLET, FILM COATED ORAL DAILY
Qty: 5 | Refills: 0 | Status: ACTIVE | COMMUNITY
Start: 2019-12-17 | End: 1900-01-01

## 2019-12-17 RX ORDER — LEVALBUTEROL HYDROCHLORIDE 0.63 MG/3ML
0.63 SOLUTION RESPIRATORY (INHALATION)
Qty: 120 | Refills: 3 | Status: ACTIVE | COMMUNITY
Start: 2019-12-17 | End: 1900-01-01

## 2019-12-17 NOTE — PROCEDURE
[FreeTextEntry1] : PFT revealed moderate restrictive and severe obstructive dysfunction, with a FEV1 of 0.92L, which is 41% of predicted, with a normal flow volume loop \par \par Chest CT (12.7.19) reveals Findings consistent with pulmonary fibrosis. Not significantly changed since most recent exam 5/26/2019, but has been slowly progressing since the oldest available comparison study on 10/19/2010.

## 2019-12-17 NOTE — REVIEW OF SYSTEMS
[Dry Eyes] : dryness of the eyes [Cough] : cough [Wheezing] : wheezing [As Noted in HPI] : as noted in HPI [Negative] : Sleep Disorder [Sputum] : not coughing up ~M sputum [Chest Discomfort] : no chest discomfort [Heartburn] : no heartburn [Reflux] : no reflux [Dysphagia] : no dysphagia [Constipation] : no constipation [Diarrhea] : no diarrhea

## 2019-12-17 NOTE — PHYSICAL EXAM
[General Appearance - Well Developed] : well developed [Normal Appearance] : normal appearance [Well Groomed] : well groomed [General Appearance - Well Nourished] : well nourished [No Deformities] : no deformities [General Appearance - In No Acute Distress] : no acute distress [Normal Conjunctiva] : the conjunctiva exhibited no abnormalities [Eyelids - No Xanthelasma] : the eyelids demonstrated no xanthelasmas [Normal Oropharynx] : normal oropharynx [Neck Appearance] : the appearance of the neck was normal [Neck Cervical Mass (___cm)] : no neck mass was observed [Jugular Venous Distention Increased] : there was no jugular-venous distention [Thyroid Diffuse Enlargement] : the thyroid was not enlarged [Thyroid Nodule] : there were no palpable thyroid nodules [Heart Rate And Rhythm] : heart rate and rhythm were normal [Heart Sounds] : normal S1 and S2 [Exaggerated Use Of Accessory Muscles For Inspiration] : no accessory muscle use [Abdomen Soft] : soft [Abdomen Tenderness] : non-tender [Abdomen Mass (___ Cm)] : no abdominal mass palpated [Abnormal Walk] : normal gait [Gait - Sufficient For Exercise Testing] : the gait was sufficient for exercise testing [Nail Clubbing] : no clubbing of the fingernails [Cyanosis, Localized] : no localized cyanosis [Petechial Hemorrhages (___cm)] : no petechial hemorrhages [Skin Color & Pigmentation] : normal skin color and pigmentation [No Venous Stasis] : no venous stasis [Skin Lesions] : no skin lesions [No Skin Ulcers] : no skin ulcer [Deep Tendon Reflexes (DTR)] : deep tendon reflexes were 2+ and symmetric [No Xanthoma] : no  xanthoma was observed [Sensation] : the sensory exam was normal to light touch and pinprick [No Focal Deficits] : no focal deficits [Oriented To Time, Place, And Person] : oriented to person, place, and time [Impaired Insight] : insight and judgment were intact [Affect] : the affect was normal [I] : I [] : no respiratory distress [Respiration, Rhythm And Depth] : normal respiratory rhythm and effort [Auscultation Breath Sounds / Voice Sounds] : lungs were clear to auscultation bilaterally [FreeTextEntry1] : I:E ratio 1:3; inspiratory crackles and expiratory wheezes bilaterally

## 2019-12-17 NOTE — ADDENDUM
[FreeTextEntry1] : Documented by Del Blanco acting as a scribe for Dr. Louis Grigsby on 12/17/2019.\par \par All medical record entries made by the Scribe were at my, Dr. Louis Grigsby's, direction and personally dictated by me on 12/17/2019. I have reviewed the chart and agree that the record accurately reflects my personal performance of the history, physical exam, assessment and plan. I have also personally directed, reviewed, and agree with the discharge instructions.

## 2019-12-17 NOTE — HISTORY OF PRESENT ILLNESS
[FreeTextEntry1] : Mr. Post is an 89 year old male with a history of bronchiectasis, COPD, dyspnea, GERD, and interstitial lung disease presenting to the office today for a follow up visit. His chief complaint is his cough. \par -his daughter notes he developed a cold about 7 days ago, and developed a tight cough\par -his daughter states the cough has worsened in the past 2 days\par -his daughter states the cough is nonproductive\par -his daughter states he has dry eyes, but not itchy eyes\par -his daughter states he hasn't been feeling his normal self recently, and isn't eating well, has lost weight, is fatigued, and sleeps often\par -his daughter states he lives alone, and she and her brother visit him daily\par -his daughter states he is wheezing\par -he denies any chest pain, chest pressure, diarrhea, constipation, dysphagia, dizziness, sour taste in the mouth, heartburn, reflux

## 2019-12-17 NOTE — ASSESSMENT
[FreeTextEntry1] : Mr. Post's shortness of breath is multifactorial due to pulmonary fibrosis, COPD, on Amiodarone, bronchiectasis, and poor mechanics as well as underlying cardiac disease/Afib. His most recent CT (11/23/2018) was unchanged from 2016, and is currently in the midst of URI induced COPD exacerbation.\par \par His shortness of breath is multifactorial due to:\par - lung diseases - ILDz\par -poor breathing mechanics \par -overweight/out of shape\par -CAD \par \par Problem 1A: acute bronchitis\par -Add Zithromax 500 mg BID for 5 days (Hold statin)\par You have a clinical scenario most c/q acute bronchitis the etiology of which is unknown but empiric antibiotics are indicated. Hydration, mucolytics including mucinex, robitussin and the like are indicated. Cough controlling agents will be needed. \par \par problem 1: COPD (exacerbation)\par -Add a short course of Prednisone; 20 mg for 7 days, then 10 mg for 7 days\par Information sheet given to the patient to be reviewed, this medication is never to be used without consulting the prescribing physician. Proper dietary restraint is necessary specifically salt containing foods, if any reaction may occur should be reported. \par \par -Add Xopenex (0.63) TID by nebulizer, PRN  (gargle and rinse after use) - treatment given in office today\par - Continue  Anoro 1 inhalation QD\par - Continue Ventolin 2 inhalations PRN up to Q6H \par \par -Inhaler technique reviewed as well as oral hygiene techniques reviewed with patient. Avoidance of cold air, extremes of temperature, rescue inhaler should be used before exercise. Order of medication reviewed with patient. Recommended use of a cool mist humidifier in the bedroom. \par \par Asthma is believed to be caused by inherited (genetic) and environmental factor, but its exact cause is unknown. Asthma may be triggered by allergens, lung infections, or irritants in the air. Asthma triggers are different for each person \par \par  problem 2: UIP/ Pulmonary fibrosis (stable)\par -follow up CT both prone and supine stable 11/2019\par - Next CT 11/2020\par -Patient Refused Esbriet / Ofev \par \par problem 3: Amiodarone therapy - AS\par -To have full PFTs\par -recommended to follow up with a cardiologist\par Amiodarone/bleomycin/methotrexate and other drugs have been associated with pulmonary parenchymal damage. These drugs require pulmonary function testing including DLCO regularly and possible CT/CXR if respiratory complaints develop. PFTs should be performed at 6 month intervals. \par \par problem 4: GERD\par -continue Pepcid 40mg QHS\par \par -Rule of 2s: avoid eating too much, eating too fast, eating too late, eating too spicy, eating two hours before bed\par -Things to avoid including overeating, spicy foods, tight clothing, eating within three hours of bed, this list is not all inclusive. \par -For treatment of reflux, possible options discussed including diet control, H2 blockers, PPIs, as well as coating motility agents discussed as treatment options. Timing of meals and proximity of last meal to sleep were discussed. If symptoms persist, a formal gastrointestinal evaluation is needed.\par \par problem 5: overweight/out of shape\par Weight loss, exercise, and diet control were discussed and are highly encouraged. Treatment options were given such as, aqua therapy, and contacting a nutritionist. Recommended to use the elliptical, stationary bike, less use of treadmill. Mindful eating was explained to the patient Obesity is associated with worsening asthma, shortness of breath, and potential for cardiac disease, diabetes, and other underlying medical conditions. \par \par problem 6: poor breathing mechanics\par -Proper breathing techniques were reviewed with an emphasis of exhalation. Patient instructed to breath in for 1 second and out for four seconds. Patient was encouraged to not talk while walking. \par \par Problem 7: Low Vitamin D\par -recommended to take Vitamin D, 50,000 units every 2 weeks\par \par Low Vitamin D has been associated with asthma exacerbations and increased allergic symptoms. The goal based on recent information is maintaining levels between 50-70 and low normal is 30. Recommended 50,000 units every two weeks to once a month depending on the level. \par \par  \par  F/U in 4 months with full PFT's and 6 minute walk\par Patient is encouraged to call with any changes, concerns, or questions.

## 2020-01-21 ENCOUNTER — RX RENEWAL (OUTPATIENT)
Age: 85
End: 2020-01-21

## 2020-01-24 ENCOUNTER — APPOINTMENT (OUTPATIENT)
Dept: PULMONOLOGY | Facility: CLINIC | Age: 85
End: 2020-01-24
Payer: MEDICARE

## 2020-01-24 ENCOUNTER — NON-APPOINTMENT (OUTPATIENT)
Age: 85
End: 2020-01-24

## 2020-01-24 VITALS
BODY MASS INDEX: 25.76 KG/M2 | RESPIRATION RATE: 16 BRPM | HEART RATE: 57 BPM | HEIGHT: 68 IN | DIASTOLIC BLOOD PRESSURE: 65 MMHG | SYSTOLIC BLOOD PRESSURE: 120 MMHG | WEIGHT: 170 LBS | OXYGEN SATURATION: 96 %

## 2020-01-24 DIAGNOSIS — J84.9 INTERSTITIAL PULMONARY DISEASE, UNSPECIFIED: ICD-10-CM

## 2020-01-24 DIAGNOSIS — R09.02 HYPOXEMIA: ICD-10-CM

## 2020-01-24 DIAGNOSIS — R93.89 ABNORMAL FINDINGS ON DIAGNOSTIC IMAGING OF OTHER SPECIFIED BODY STRUCTURES: ICD-10-CM

## 2020-01-24 DIAGNOSIS — K21.9 GASTRO-ESOPHAGEAL REFLUX DISEASE W/OUT ESOPHAGITIS: ICD-10-CM

## 2020-01-24 DIAGNOSIS — J44.9 CHRONIC OBSTRUCTIVE PULMONARY DISEASE, UNSPECIFIED: ICD-10-CM

## 2020-01-24 DIAGNOSIS — R06.02 SHORTNESS OF BREATH: ICD-10-CM

## 2020-01-24 PROCEDURE — 94618 PULMONARY STRESS TESTING: CPT

## 2020-01-24 PROCEDURE — 99214 OFFICE O/P EST MOD 30 MIN: CPT | Mod: 25

## 2020-01-24 PROCEDURE — 94010 BREATHING CAPACITY TEST: CPT

## 2020-01-24 NOTE — PHYSICAL EXAM
[Well Groomed] : well groomed [General Appearance - Well Nourished] : well nourished [Normal Appearance] : normal appearance [General Appearance - Well Developed] : well developed [General Appearance - In No Acute Distress] : no acute distress [No Deformities] : no deformities [Normal Oropharynx] : normal oropharynx [Normal Conjunctiva] : the conjunctiva exhibited no abnormalities [Neck Appearance] : the appearance of the neck was normal [Exaggerated Use Of Accessory Muscles For Inspiration] : no accessory muscle use [Respiration, Rhythm And Depth] : normal respiratory rhythm and effort [Gait - Sufficient For Exercise Testing] : the gait was sufficient for exercise testing [Auscultation Breath Sounds / Voice Sounds] : lungs were clear to auscultation bilaterally [Nail Clubbing] : no clubbing of the fingernails [Skin Color & Pigmentation] : normal skin color and pigmentation [Cyanosis, Localized] : no localized cyanosis [Oriented To Time, Place, And Person] : oriented to person, place, and time [Skin Turgor] : normal skin turgor [] : no rash [Impaired Insight] : insight and judgment were intact [FreeTextEntry1] : Hard of hearing

## 2020-01-24 NOTE — PROCEDURE
[FreeTextEntry1] : 6 minute walk test performed in office today revealed O2 desaturation to 83% after 1 minute of exertion on room air. Supplemental O2 needed at 2LPM.

## 2020-01-24 NOTE — ASSESSMENT
[FreeTextEntry1] : The plan for the patient is as follows: \par \par 1. COPD\par - Continue ProAir rescue inhaler 2 Puffs PRN; can use Xopenex via nebulizer as well. \par - Continue Anoro Ellipta 1 inhalation, daily.\par \par 2. SOB/Hypoxia requiring supplemental O2:\par - s/p 6 minute walk test in office 1/24 - Supplemental O2 needed. \par - Patient is in a chronic stable state requiring long term O2 therapy d/t COPD. Pt unable to maintain O2 Sat on medication alone. The needs for home O2 therapy has been discussed with patient and daughter. \par - RX to be sent to [facility] for Supplemental O2.\par \par \par 3. GERD:\par - Continue Famotidine 40 mg PO QHS\par \par 4. Abnormal CT/ILDz\par - Last CT scan 12/7/2019\par - Next to be done 12/2020.\par \par \par Patient or daughter encouraged to call office with further questions or concerns. \par Pt. to be set up with Dr. Grigsby for follow-up visit in 3 months with PFT's.\par Verbalized understanding.

## 2020-01-24 NOTE — HISTORY OF PRESENT ILLNESS
[FreeTextEntry1] : Mr. Post is an 89 year old male with a history of bronchiectasis, COPD, dyspnea, GERD, and interstitial lung disease presenting to the office today for a follow up visit accompanied by his daughter. \par Mr. Post is hard of hearing, so the subjective information was obtained mostly by daughter.\par \par Daughter states that when patient is at rest he is asymptomatic.\par She states patient lives alone in a 2 floor home. Lately she has been bringing his meals \par Daughter states she is concerned because has noticed that her father gets SOB quicker than he used upon exertion.  She also notes his breaks after dyspnea episodes have needed to be longer for him to recuperate.  Aside from DOHERTY patient is feeling generally well.\par \par Denies fever/chills, wheezing, fatigue, cough or cold-like symptoms.

## 2020-01-27 ENCOUNTER — TRANSCRIPTION ENCOUNTER (OUTPATIENT)
Age: 85
End: 2020-01-27

## 2020-03-17 ENCOUNTER — APPOINTMENT (OUTPATIENT)
Dept: PULMONOLOGY | Facility: CLINIC | Age: 85
End: 2020-03-17

## 2020-04-07 ENCOUNTER — TRANSCRIPTION ENCOUNTER (OUTPATIENT)
Age: 85
End: 2020-04-07

## 2020-04-09 ENCOUNTER — TRANSCRIPTION ENCOUNTER (OUTPATIENT)
Age: 85
End: 2020-04-09

## 2020-05-05 ENCOUNTER — APPOINTMENT (OUTPATIENT)
Dept: INTERNAL MEDICINE | Facility: CLINIC | Age: 85
End: 2020-05-05

## 2021-03-30 NOTE — HISTORY OF PRESENT ILLNESS
[FreeTextEntry1] : Mr. Post is an 88 year old male with a past medical history of bronchiectasis, COPD, dyspnea, GERD, and interstitial lung disease presenting to the office today for a routine follow up visit. His chief complaint is his shortness of breath\par -he states he is feeling fair today\par -he reports he is persistently short of breath\par -his daughter notes he had an episode of A-fib last year\par -his daughter states he was hospitalized for a cardioversion and is currently on amiodarone therapy \par -he notes he is sleeping well\par -he reports his heartburn is still present but has improved\par -he states his legs feel weak and swelling has decreased\par -his daughter notes his sinuses are clear\par -he reports his balance is good\par -he denies any chest pain, chest pressure, diarrhea, constipation, dysphagia, dizziness, itchy eyes, itchy ears, swollen glands, myalgias, wheezing, or sour taste in the mouth.\par \par   63

## 2022-04-01 NOTE — PROGRESS NOTE ADULT - ASSESSMENT
Impression:  1) Anemia - normocytic with low iron and low saturation consistent and normal TIBC with iron def anemia.  Patient is HD stable without any overt GI bleeding and rectal exam is significant for brown stool in vault.  Source possibly a GI given significant drop, possibly occult bleeding vs, PUD vs angioectasias, less likely diverticulosis given lack of BRBPR.    Recommendations:   - plan for EGD/colon on Monday   - clear diet today, NPO after midnight   - Movi Prep Sunday night, GI fellow to order   - ensure patient has completed entire prep and is having clear bowel movements   - can continue to trend H/H    - transfuse if Hg < 7   - call GI with an overt GI bleeding n/a